# Patient Record
Sex: FEMALE | Race: WHITE | Employment: OTHER | URBAN - METROPOLITAN AREA
[De-identification: names, ages, dates, MRNs, and addresses within clinical notes are randomized per-mention and may not be internally consistent; named-entity substitution may affect disease eponyms.]

---

## 2017-01-03 DIAGNOSIS — G40.209 PARTIAL EPILEPSY WITH IMPAIRMENT OF CONSCIOUSNESS (HCC): ICD-10-CM

## 2017-01-05 RX ORDER — OXCARBAZEPINE 150 MG/1
TABLET, FILM COATED ORAL
Qty: 630 TABLET | Refills: 0 | Status: SHIPPED | OUTPATIENT
Start: 2017-01-05 | End: 2017-04-19 | Stop reason: SDUPTHER

## 2017-03-02 PROBLEM — E78.1 HYPERTRIGLYCERIDEMIA: Status: ACTIVE | Noted: 2017-03-02

## 2017-03-02 PROBLEM — I10 ESSENTIAL HYPERTENSION: Status: ACTIVE | Noted: 2017-03-02

## 2017-04-19 DIAGNOSIS — G40.209 PARTIAL EPILEPSY WITH IMPAIRMENT OF CONSCIOUSNESS (HCC): ICD-10-CM

## 2017-04-19 RX ORDER — OXCARBAZEPINE 150 MG/1
TABLET, FILM COATED ORAL
Qty: 630 TABLET | Refills: 1 | Status: SHIPPED | OUTPATIENT
Start: 2017-04-19 | End: 2017-08-28 | Stop reason: SDUPTHER

## 2017-08-28 ENCOUNTER — OFFICE VISIT (OUTPATIENT)
Dept: NEUROLOGY | Age: 47
End: 2017-08-28
Payer: MEDICARE

## 2017-08-28 VITALS
HEART RATE: 78 BPM | DIASTOLIC BLOOD PRESSURE: 104 MMHG | SYSTOLIC BLOOD PRESSURE: 168 MMHG | BODY MASS INDEX: 34.78 KG/M2 | HEIGHT: 61 IN | WEIGHT: 184.2 LBS

## 2017-08-28 DIAGNOSIS — I67.9 CEREBROVASCULAR DISEASE: ICD-10-CM

## 2017-08-28 DIAGNOSIS — R03.0 ELEVATED BLOOD PRESSURE READING: ICD-10-CM

## 2017-08-28 DIAGNOSIS — M25.572 ARTHRALGIA OF LEFT ANKLE: ICD-10-CM

## 2017-08-28 DIAGNOSIS — G40.209 PARTIAL EPILEPSY WITH IMPAIRMENT OF CONSCIOUSNESS (HCC): Primary | ICD-10-CM

## 2017-08-28 DIAGNOSIS — H54.7 BLINDNESS: ICD-10-CM

## 2017-08-28 PROCEDURE — 1036F TOBACCO NON-USER: CPT | Performed by: PSYCHIATRY & NEUROLOGY

## 2017-08-28 PROCEDURE — G8427 DOCREV CUR MEDS BY ELIG CLIN: HCPCS | Performed by: PSYCHIATRY & NEUROLOGY

## 2017-08-28 PROCEDURE — G8417 CALC BMI ABV UP PARAM F/U: HCPCS | Performed by: PSYCHIATRY & NEUROLOGY

## 2017-08-28 PROCEDURE — 99214 OFFICE O/P EST MOD 30 MIN: CPT | Performed by: PSYCHIATRY & NEUROLOGY

## 2017-08-28 RX ORDER — OXCARBAZEPINE 150 MG/1
TABLET, FILM COATED ORAL
Qty: 630 TABLET | Refills: 3 | Status: SHIPPED | OUTPATIENT
Start: 2017-08-28 | End: 2018-05-13 | Stop reason: SDUPTHER

## 2017-08-28 RX ORDER — NAPROXEN 375 MG/1
375 TABLET ORAL 2 TIMES DAILY WITH MEALS
Qty: 60 TABLET | Refills: 0 | Status: SHIPPED | OUTPATIENT
Start: 2017-08-28 | End: 2017-11-01 | Stop reason: ALTCHOICE

## 2017-11-01 ENCOUNTER — HOSPITAL ENCOUNTER (OUTPATIENT)
Age: 47
Discharge: HOME | End: 2017-11-01
Payer: MEDICARE

## 2017-11-01 ENCOUNTER — HOSPITAL ENCOUNTER (OUTPATIENT)
Dept: GENERAL RADIOLOGY | Age: 47
Discharge: HOME | End: 2017-11-01
Payer: MEDICARE

## 2017-11-01 DIAGNOSIS — M54.50 ACUTE MIDLINE LOW BACK PAIN WITHOUT SCIATICA: ICD-10-CM

## 2017-11-01 PROCEDURE — 72100 X-RAY EXAM L-S SPINE 2/3 VWS: CPT

## 2018-05-13 DIAGNOSIS — G40.209 PARTIAL EPILEPSY WITH IMPAIRMENT OF CONSCIOUSNESS (HCC): ICD-10-CM

## 2018-05-14 RX ORDER — OXCARBAZEPINE 150 MG/1
TABLET, FILM COATED ORAL
Qty: 210 TABLET | Refills: 5 | Status: SHIPPED | OUTPATIENT
Start: 2018-05-14 | End: 2018-08-06 | Stop reason: SDUPTHER

## 2018-07-16 ENCOUNTER — HOSPITAL ENCOUNTER (OUTPATIENT)
Dept: CT IMAGING | Age: 48
Discharge: HOME | End: 2018-07-18
Payer: MEDICARE

## 2018-07-16 DIAGNOSIS — R10.9 ABDOMINAL PAIN, UNSPECIFIED ABDOMINAL LOCATION: ICD-10-CM

## 2018-07-16 PROCEDURE — 74177 CT ABD & PELVIS W/CONTRAST: CPT

## 2018-07-16 PROCEDURE — 6360000004 HC RX CONTRAST MEDICATION: Performed by: NURSE PRACTITIONER

## 2018-07-16 RX ADMIN — IOPAMIDOL 100 ML: 612 INJECTION, SOLUTION INTRAVENOUS at 11:10

## 2018-07-25 ENCOUNTER — HOSPITAL ENCOUNTER (OUTPATIENT)
Dept: ULTRASOUND IMAGING | Age: 48
Discharge: HOME | End: 2018-07-27
Payer: MEDICARE

## 2018-07-25 DIAGNOSIS — N83.201 RIGHT OVARIAN CYST: ICD-10-CM

## 2018-07-25 PROCEDURE — 76856 US EXAM PELVIC COMPLETE: CPT

## 2018-08-06 ENCOUNTER — OFFICE VISIT (OUTPATIENT)
Dept: NEUROLOGY | Age: 48
End: 2018-08-06
Payer: MEDICARE

## 2018-08-06 VITALS
DIASTOLIC BLOOD PRESSURE: 78 MMHG | HEART RATE: 66 BPM | SYSTOLIC BLOOD PRESSURE: 148 MMHG | WEIGHT: 183.4 LBS | HEIGHT: 61 IN | BODY MASS INDEX: 34.63 KG/M2

## 2018-08-06 DIAGNOSIS — H54.7 BLINDNESS: ICD-10-CM

## 2018-08-06 DIAGNOSIS — M25.572 ARTHRALGIA OF LEFT ANKLE: Primary | ICD-10-CM

## 2018-08-06 DIAGNOSIS — G40.209 PARTIAL EPILEPSY WITH IMPAIRMENT OF CONSCIOUSNESS (HCC): ICD-10-CM

## 2018-08-06 DIAGNOSIS — R03.0 ELEVATED BLOOD PRESSURE READING: ICD-10-CM

## 2018-08-06 DIAGNOSIS — I67.9 CEREBROVASCULAR DISEASE: ICD-10-CM

## 2018-08-06 PROCEDURE — G8427 DOCREV CUR MEDS BY ELIG CLIN: HCPCS | Performed by: PSYCHIATRY & NEUROLOGY

## 2018-08-06 PROCEDURE — 1036F TOBACCO NON-USER: CPT | Performed by: PSYCHIATRY & NEUROLOGY

## 2018-08-06 PROCEDURE — G8417 CALC BMI ABV UP PARAM F/U: HCPCS | Performed by: PSYCHIATRY & NEUROLOGY

## 2018-08-06 PROCEDURE — 99214 OFFICE O/P EST MOD 30 MIN: CPT | Performed by: PSYCHIATRY & NEUROLOGY

## 2018-08-06 RX ORDER — OXCARBAZEPINE 150 MG/1
TABLET, FILM COATED ORAL
Qty: 210 TABLET | Refills: 5 | Status: SHIPPED | OUTPATIENT
Start: 2018-08-06 | End: 2019-05-04 | Stop reason: SDUPTHER

## 2018-08-06 NOTE — PROGRESS NOTES
NEUROLOGY FOLLOW-UP  Patient Name:  Rigoberto Rubin  :   1970  Clinic Visit Date: 2018    I saw Ms. Rigoberto Rubin in follow-up in the office today in continuation of neurologic care. She is a 51 y.o.  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 Her hx is also significant for white coat syndrome. Today she is brought to the clinic by her mom stating that patient has not had any seizure activity since last visit. She does have arthritis in both ankles and she has difficulty walking with it. Patient has been using walker. She had occasional falls related to arthritis superimposed on legal blindness. Patient has not had any head injury resulting in headaches or breakthrough seizures. Mom stated \"Blood pressure checked at work shop and its always 120s/low 80s\". Patient was diagnosed with \"white coat syndrome\". Patient's mom also stated that her PCP, Dr. Rafia Davidson has been watching her blood work and has been closely following her daughter and patient's mom is happy with the care stating once a year follow up in neurology clinic is ok. Patient has not had any aura or feeling of about to have a seizure since spring of 2017. Patient's mom is requesting a prescription for Trileptal and also requested not to make any changes in Trileptal dose as patient is tolerating it well. Of note; her last seizure was in . She has not had any recent hospitalizations. During earlier visits, discussion done about slow AED downward taper. Earlier patient agreed for it. But later patient became very agitated with the concern about possibility of seizure recurrence by decreasing the dose of Trileptal.  Mom says patient is very scared of decreasing the dose of trileptal.  Mom requested not to make any changes.   Presently she is on Trileptal 150 mg 3 tab in am and 4 tab in pm.  Patient has not been having any adverse effects related to 1978     Social History: Jaime Calhoun  reports that she has never smoked. She has never used smokeless tobacco. She reports that she does not drink alcohol or use drugs. Family History   Problem Relation Age of Onset    Hypertension Mother     High Cholesterol Mother     High Blood Pressure Mother     Arthritis Mother     No Known Problems Father      On exam: Blood pressure (!) 148/78, pulse 66, height 5' 1\" (1.549 m), weight 183 lb 6.4 oz (83.2 kg), not currently breastfeeding. Patient's mom stated; when checked at home; its always normal.    General appearance: Ana Rubio is a well-built and well-nourished female  in no acute cardiorespiratory distress. HEENT:  Normocephalic and atraumatic. Neck:  Supple. No carotid bruits heard. Neurologic exam:  she  is alert, awake, and oriented to self, place, and time. she followed one-step as well as complex commands. she  could recall major news events well. she has good naming and good repetition. Able to spell the word backwards well. Able to do serial subtractions well. Able to read a sentence well. Able to write a sentence well.    her higher intellectual functions seem to be with in normal limits. Cranial nerve exam:  Visual acuity 20/200 in right eye and 20/400 in left eye without correction. PERRLA and EOMI. Visual fields are grossly full to confrontation. Fundi reveal intact venous pulsations. Disc margins are clear. Facial sensation is intact. Face appears symmetric. Tongue protrudes midline. Palate elevates symmetrically. Able to hear to the finger rub equally well. Shoulder shrug is intact bilaterally. Motor exam:  she has good strength of grade 5/5 in proximal and distal muscle groups of both left and right and  upper and lower extremities. DTRs are 1+ and symmetric at biceps, brachioradialis and triceps reflexes. DTRs:  2+ patellar and Achilles reflexes bilaterally. No pronator drift noted. No atrophy noted.  Sensory exam:  Intact to blood pressures at home and check with PCP; they voiced understanding those instructions. Counseling done regarding avoidance of seizure precipitating factors such as sleep deprivation and compliance with medications, etc. Complications and sequelae of uncontrolled seizures also discussed. Activity (bathing, swimming, etc)  restrictions also discussed. CVA, unspecified; Bilateral occipital atrophy with legal blindness: on ASA, Pravastatin for stroke prophylaxis. Hyperlipidemia with elevated LDL at 120 (with target LDL < 100): after switch from Pravastatin to Atorvastatin; LDL level has improved to 91 as above. Follow up in  1 year. Please note that portions of this note were completed with a voice recognition program.  Although every effort was made to ensure the accuracy of this  automated transcription, some errors in transcription may have occurred, occasionally words are mis-transcribed.

## 2018-08-09 ENCOUNTER — HOSPITAL ENCOUNTER (OUTPATIENT)
Dept: PHYSICAL THERAPY | Age: 48
Setting detail: THERAPIES SERIES
Discharge: HOME | End: 2018-08-09
Payer: MEDICARE

## 2018-08-09 PROCEDURE — 97110 THERAPEUTIC EXERCISES: CPT

## 2018-08-09 PROCEDURE — G8979 MOBILITY GOAL STATUS: HCPCS

## 2018-08-09 PROCEDURE — 97161 PT EVAL LOW COMPLEX 20 MIN: CPT

## 2018-08-09 PROCEDURE — 97140 MANUAL THERAPY 1/> REGIONS: CPT

## 2018-08-09 PROCEDURE — G8978 MOBILITY CURRENT STATUS: HCPCS

## 2018-08-09 NOTE — PROGRESS NOTES
verbalized/demonstrated good understanding:     [x] Yes         [] No, pt required further clarification. [] Primary Impairment :  G Code:    [x] Mobility         [] Carry        [] Body Position       [] Self Care      [] Other:   Functional Impairment Current:  [] 0%    [] 1-19% [] 20-39% [x] 40-59% [] 60-79%   [] 80-99% [] 100%  Functional Impairment Goal:  [] 0%    [] 1-19% [x] 20-39% [] 40-59% [] 60-79%   [] 80-99% [] 100%  G Code Functional Impairment determined by:  [x] Clinical Judgment   [] Outcome Measure:     Goals  Short term goals  Time Frame for Short term goals: 3 weeks  Short term goal 1: Pt to be instructed in home program.   Short term goal 2: Pt to have only min tenderness right greater trochanter. Short term goal 3: Pt to begin hip strengthening program.     Long term goals  Time Frame for Long term goals : 6 weeks  Long term goal 1: Pt to report independence and compliance with home program.   Long term goal 2: Pt to demonstrate 4/5 strength right hip flex and glute medius to assist with ADL's. Long term goal 3: Pt to have no complaints of tenderness with palpation.        Patient goals : \"Control pain\"        Minutes Tracking:  Time In: 6908  Time Out: [address]  Minutes: [address], PT, DPT, CMPT     8/9/2018

## 2018-08-09 NOTE — PLAN OF CARE
[facility]           Phone: [phone]             Outpatient Physical Therapy  Fax: [phone]                                           Date: 2018  Patient: Norma Lieberman : 1970 CSN #: [id number]   Referring Practitioner:  Peggy Navarro. Trena Muro DO Referral Date:  18       [x] Plan of Care   [] Updated Plan of Care    Dates of Service to Include: 2018 to 18    Diagnosis:  Gr. Trochanteric Bursitis, M70.61    Rehab (Treatment) Diagnosis:  right hip pain             Onset Date:  18    Attendance  Total # of Visits to Date: 1 No Show: 0 Canceled Appointment: 0    Assessment  Assessment: Pt is 50 y/o female with complaints of right hip pain. Ambulates with 4WW inside and outside of home due to balance. Moderate tenderness right gr. trochanter. ROM is WFL. Strength right hip: flex 4-/5, glute med 3+/5; left hip: flex 4/5, glute med 4/5. Pain noted with right hip scour and MARVEL testing. Pt will benefit from PT to address deficits. [x] Primary Impairment :   G Code:    [x] Mobility         [] Carry        [] Body Position       [] Self Care      [] Other:   Functional Impairment Current:  [] 0%    [] 1-19% [] 20-39% [x] 40-59% [] 60-79%    [] 80-99% [] 100%  Functional Impairment Goal:  [] 0%    [] 1-19% [x] 20-39% [] 40-59% [] 60-79%    [] 80-99% [] 100%  G Code Functional Impairment determined by:  [x] Clinical Judgment   [] Outcome Measure:     Goals  Short term goals  Time Frame for Short term goals: 3 weeks  Short term goal 1: Pt to be instructed in home program.   Short term goal 2: Pt to have only min tenderness right greater trochanter.    Short term goal 3: Pt to begin hip strengthening program.   Long term goals  Time Frame for Long term goals : 6 weeks  Long term goal 1: Pt to report independence and compliance with home program.   Long term goal 2: Pt to demonstrate 4/5 strength

## 2018-08-13 ENCOUNTER — HOSPITAL ENCOUNTER (OUTPATIENT)
Dept: PHYSICAL THERAPY | Age: 48
Setting detail: THERAPIES SERIES
Discharge: HOME | End: 2018-08-13
Payer: MEDICARE

## 2018-08-13 PROCEDURE — 97110 THERAPEUTIC EXERCISES: CPT

## 2018-08-13 PROCEDURE — 97140 MANUAL THERAPY 1/> REGIONS: CPT

## 2018-08-13 NOTE — PROGRESS NOTES
Phone: Leslie           Fax: [phone]                           Outpatient Physical Therapy                                                                            Daily Note    Patient: Luís Barragan : 1970  CSN #: [id number]   Referring Practitioner:  Scout Null. Jason Quan DO    Referral Date : 18     Date: 2018    Diagnosis: Gr. Trochanteric Bursitis, M70.61  Treatment Diagnosis: right hip pain    Onset Date: 18  PT Insurance Information: Medicare / Medicaid  Total # of Visits Approved: 12 Per Physician Order  Total # of Visits to Date: 2  No Show: 0  Canceled Appointment: 0      Pre-Treatment Pain:  4/10  Subjective: Liked the soft tissue mobilization last visit but according to caregiver, pt was slightly more unsteady following evaluation. Exercises:  Exercise 2: hooklying hip ER red band - 2x10  Exercise 3: bike - 4 mins        Manual:  Joint mobilization: oscilations to right hip   Manual traction: right hip  Soft Tissue Mobalization: STM heated thermopobe at 3 bars x 8 mins      Assessment  Assessment: Pt with fair/poor tolerance to recumbant bike this date, most due to c/o foot pain. Requires frequent cueing during exercises for proper form and technique. Will continue to progress as pt tolerates. Patient Education  Purpose of MT and importance of HEP  Pt verbalized/demonstrated good understanding:     [x] Yes         [] No, pt required further clarification. Post Treatment Pain:  4/10      Plan  Times per week: 3  Plan weeks: 6      Goals  (Total # of Visits to Date: 2)   Short Term Goals - Time Frame for Short term goals: 3 weeks     Short term goal 1: Pt to be instructed in home program. - met                                        []Met   []Partially met  []Not met   Short term goal 2: Pt to have only min tenderness right greater trochanter.    []Met   []Partially met  []Not met   Short term goal 3: Pt to begin hip

## 2018-08-16 ENCOUNTER — HOSPITAL ENCOUNTER (OUTPATIENT)
Dept: PHYSICAL THERAPY | Age: 48
Setting detail: THERAPIES SERIES
Discharge: HOME | End: 2018-08-16
Payer: MEDICARE

## 2018-08-16 PROCEDURE — 97140 MANUAL THERAPY 1/> REGIONS: CPT

## 2018-08-16 PROCEDURE — 97110 THERAPEUTIC EXERCISES: CPT

## 2018-08-16 NOTE — PROGRESS NOTES
Phone: Leslie           Fax: [phone]                           Outpatient Physical Therapy                                                                            Daily Note    Patient: Alejo Diaz : 1970  CSN #: [id number]   Referring Practitioner:  Omar Chaudhry DO    Referral Date : 18     Date: 2018    Diagnosis: Gr. Trochanteric Bursitis, M70.61  Treatment Diagnosis: right hip pain    Onset Date: 18  PT Insurance Information: Medicare / Medicaid  Total # of Visits Approved: 12 Per Physician Order  Total # of Visits to Date: 3  No Show: 0  Canceled Appointment: 0      Pre-Treatment Pain:  4/10  Subjective: Pt reports she feels like she is getting better. Pt states her hip overall feels less pain. Pt rates current pain 4/10. Exercises:  Exercise 1: **HEP - hooklying hip ER red band  Exercise 2: hooklying hip ER red band - 2x10  Exercise 3: bike - 5 mins  Exercise 4: Sink ex 15x ea       Manual:      Manual traction: right hip  Soft Tissue Mobalization: STM heated thermopobe at 3 bars x 8 mins    Assessment  Assessment: Added sink ex today to Pt ROSALIA program with appropriate tolerance for increased Bruno LE and hip strength. Pt completed 5 mins on [employer] bike today. Voiced pain relief with R hip distractions/ thermoprobe. Patient Education  Cont current HEP. Pt verbalized/demonstrated good understanding:     [x] Yes         [] No, pt required further clarification. Post Treatment Pain:  4/10      Plan  Times per week: 3  Plan weeks: 6      Goals  (Total # of Visits to Date: 3)   Short Term Goals - Time Frame for Short term goals: 3 weeks     Short term goal 1: Pt to be instructed in home program. - met                                        [x]Met   []Partially met  []Not met   Short term goal 2: Pt to have only min tenderness right greater trochanter.    []Met  []Partially met  [x]Not met   Short term goal 3: Pt to begin hip strengthening program. -MET, cont  [x]Met   []Partially met  []Not met      []Met   []Partially met  []Not met     Long Term Goals - Time Frame for Long term goals : 6 weeks  Long term goal 1: Pt to report independence and compliance with home program.  []Met  []Partially met  [x]Not met   Long term goal 2: Pt to demonstrate 4/5 strength right hip flex and glute medius to assist with ADL's. []Met  []Partially met  [x]Not met   Long term goal 3: Pt to have no complaints of tenderness with palpation.   []Met  []Partially met  [x]Not met     []Met  []Partially met  []Not met     []Met  []Partially met  []Not met       Minutes Tracking:  Time In: 1401  Time Out: [address]  Minutes: Junaid Rendon    Date: 8/16/2018

## 2018-08-17 ENCOUNTER — HOSPITAL ENCOUNTER (OUTPATIENT)
Dept: PHYSICAL THERAPY | Age: 48
Setting detail: THERAPIES SERIES
Discharge: HOME | End: 2018-08-17
Payer: MEDICARE

## 2018-08-17 PROCEDURE — 97140 MANUAL THERAPY 1/> REGIONS: CPT

## 2018-08-17 PROCEDURE — 97110 THERAPEUTIC EXERCISES: CPT

## 2018-08-21 ENCOUNTER — HOSPITAL ENCOUNTER (OUTPATIENT)
Dept: PHYSICAL THERAPY | Age: 48
Setting detail: THERAPIES SERIES
Discharge: HOME | End: 2018-08-21
Payer: MEDICARE

## 2018-08-21 PROCEDURE — 97140 MANUAL THERAPY 1/> REGIONS: CPT

## 2018-08-21 PROCEDURE — 97110 THERAPEUTIC EXERCISES: CPT

## 2018-08-21 NOTE — PROGRESS NOTES
trochanter. -MET  [x]Met   []Partially met  []Not met   Short term goal 3: Pt to begin hip strengthening program. -MET, cont  [x]Met   []Partially met  []Not met      []Met   []Partially met  []Not met     Long Term Goals - Time Frame for Long term goals : 6 weeks  Long term goal 1: Pt to report independence and compliance with home program.  []Met  []Partially met  [x]Not met   Long term goal 2: Pt to demonstrate 4/5 strength right hip flex and glute medius to assist with ADL's. []Met  []Partially met  [x]Not met   Long term goal 3: Pt to have no complaints of tenderness with palpation.   []Met  []Partially met  [x]Not met     []Met  []Partially met  []Not met     []Met  []Partially met  []Not met       Minutes Tracking:  Time In: 7438  Time Out: [address] [facility]  Minutes: [place], [place]    Date: 8/21/2018

## 2018-08-23 ENCOUNTER — APPOINTMENT (OUTPATIENT)
Dept: PHYSICAL THERAPY | Age: 48
End: 2018-08-23
Payer: MEDICARE

## 2018-08-24 ENCOUNTER — HOSPITAL ENCOUNTER (OUTPATIENT)
Dept: PHYSICAL THERAPY | Age: 48
Setting detail: THERAPIES SERIES
Discharge: HOME | End: 2018-08-24
Payer: MEDICARE

## 2018-08-24 PROCEDURE — 97140 MANUAL THERAPY 1/> REGIONS: CPT

## 2018-08-24 PROCEDURE — 97110 THERAPEUTIC EXERCISES: CPT

## 2018-08-24 NOTE — PROGRESS NOTES
Phone: [facility]           Fax: [phone]                           Outpatient Physical Therapy                                                                            Daily Note    Patient: Dayo Hollis : 1970  CSN #: [id number]   Referring Practitioner:  Valente Covarrubias DO    Referral Date : 18     Date: 2018    Diagnosis: Gr. Trochanteric Bursitis, M70.61  Treatment Diagnosis: right hip pain    Onset Date: 18  PT Insurance Information: Medicare / Medicaid  Total # of Visits Approved: 12 Per Physician Order  Total # of Visits to Date: 6  No Show: 0  Canceled Appointment: 0      Pre-Treatment Pain:  0/10  Subjective: Pt reports she felt a muscle spasm and some LBP today throughout work day but feels better with therapy. Exercises:  Exercise 1: **HEP - hooklying hip ER red band  Exercise 2: hooklying hip ER red band - 2x10  Exercise 3: bike - 6 mins     Exercise 5: Seated LAQ, march, toe raises 2# 15x  Exercise 6: RTB add/ abd 15x ea      Manual:      Manual traction: right hip       Assessment  Assessment: LBP reported today from Pt during ther-ex program. Pt reports her hip continues to feel better overall. Patient Education  Cont current HEP. Pt verbalized/demonstrated good understanding:     [x] Yes         [] No, pt required further clarification. Post Treatment Pain:  0/10      Plan  Times per week: 3  Plan weeks: 6      Goals  (Total # of Visits to Date: 6)   Short Term Goals - Time Frame for Short term goals: 3 weeks     Short term goal 1: Pt to be instructed in home program. - met                                        [x]Met   []Partially met  []Not met   Short term goal 2: Pt to have only min tenderness right greater trochanter.  -MET  [x]Met   []Partially met  []Not met   Short term goal 3: Pt to begin hip strengthening program. -MET, cont  [x]Met  []Partially met  []Not met      []Met   []Partially met  []Not met Long Term Goals - Time Frame for Long term goals : 6 weeks  Long term goal 1: Pt to report independence and compliance with home program.  []Met  []Partially met  [x]Not met   Long term goal 2: Pt to demonstrate 4/5 strength right hip flex and glute medius to assist with ADL's. []Met  []Partially met  [x]Not met   Long term goal 3: Pt to have no complaints of tenderness with palpation.   []Met  []Partially met  [x]Not met     []Met  []Partially met  []Not met     []Met  []Partially met  []Not met       Minutes Tracking:  Time In: 5776  Time Out: [address] [facility],Third [address]  Minutes: [place], [place]     Date: 8/24/2018

## 2018-08-27 ENCOUNTER — HOSPITAL ENCOUNTER (OUTPATIENT)
Dept: PHYSICAL THERAPY | Age: 48
Setting detail: THERAPIES SERIES
Discharge: HOME | End: 2018-08-27
Payer: MEDICARE

## 2018-08-27 PROCEDURE — 97140 MANUAL THERAPY 1/> REGIONS: CPT

## 2018-08-27 PROCEDURE — G8979 MOBILITY GOAL STATUS: HCPCS

## 2018-08-27 PROCEDURE — G8978 MOBILITY CURRENT STATUS: HCPCS

## 2018-08-27 NOTE — PROGRESS NOTES
Phone: [facility]           Fax: [phone]                           Outpatient Physical Therapy                                                                            Daily Note    Patient: Mariola Ambrose : 1970  CSN #: [id number]   Referring Practitioner:  Bhaskar Macias DO    Referral Date : 18     Date: 2018    Diagnosis: Gr. Trochanteric Bursitis, M70.61  Treatment Diagnosis: right hip pain    Onset Date: 18  PT Insurance Information: Medicare / Medicaid  Total # of Visits Approved: 12 Per Physician Order  Total # of Visits to Date: 7  No Show: 0  Canceled Appointment: 0      Pre-Treatment Pain:  4/10 based on fascial expression  Subjective: Pt mother states pt has been consistently stumbling and more unsteady on days she has been in therapy; not sure why and would like to speak to Dr about it, will see him tomorrow. Pt states she does feel less pain following treatment. Exercises:   Exercise 7: **Held all exercises this date due to pt reports of unsteadiness following ROSALIA    Manual:  Joint mobilization: oscilations to right hip  Soft Tissue Mobalization: STM heated thermopobe at 3 bars x 8 mins    Modalities:  Moist heat: 10 mins before MT         Assessment  Assessment: Pt has completed 7 PT visits for right hip pain. Pt reports decrease following treatments but mother stating she has been more unsteady on days following exercise. Held all ROSALIA this date and focused on modalities for pain relief. HEP and STM applied in left SL. Pt continues with antalgic gait following treatment, pt mother's report. Pt does also continue with min tenderness right greater trochanter. Will hold PT at this time due to worsening of gait and continued reports of pain; pt has  appointment this Wed.      Patient Education  Hold PT until seen by   Pt verbalized/demonstrated good understanding:     [x] Yes         [] No, pt required further clarification. Post Treatment Pain:  4/10      Plan  Times per week: 3  Plan weeks: 6      Goals  (Total # of Visits to Date: 7)   Short Term Goals - Time Frame for Short term goals: 3 weeks     Short term goal 1: Pt to be instructed in home program. - met                                        []Met   []Partially met  []Not met   Short term goal 2: Pt to have only min tenderness right greater trochanter. -MET  []Met   []Partially met  []Not met   Short term goal 3: Pt to begin hip strengthening program. -MET, cont  []Met   []Partially met  []Not met      []Met   []Partially met  []Not met     Long Term Goals - Time Frame for Long term goals : 6 weeks  Long term goal 1: Pt to report independence and compliance with home program.  []Met  []Partially met  []Not met   Long term goal 2: Pt to demonstrate 4/5 strength right hip flex and glute medius to assist with ADL's. []Met  []Partially met  []Not met   Long term goal 3: Pt to have no complaints of tenderness with palpation.   []Met  []Partially met  []Not met     []Met  []Partially met  []Not met     []Met  []Partially met  []Not met       Minutes Tracking:  Time In: 1345  Time Out: [address]  Minutes: [id number] , PT, DPT, CMPT  Date: 8/27/2018

## 2018-08-27 NOTE — PROGRESS NOTES
[facility]           Phone: [phone]             Outpatient Physical Therapy  Fax: [phone]                                            Physician Progress Report    Date: 2018  Patient: Miky Brandon : 1970 CSN #: [id number]   Referring Practitioner:  Judy Rosen. Madeleine Mooney DO Referral Date:  18       Diagnosis:  Gr. Trochanteric Bursitis, M70.61    Rehab (Treatment) Diagnosis:  right hip pain             Onset Date:  18    Attendance  Total # of Visits to Date: 7 No Show: 0 Canceled Appointment: 0    Assessment  Assessment: Pt has completed 7 PT visits for right hip pain. Pt reports decrease following treatments but mother stating she has been more unsteady on days following exercise. Held all ROSALIA this date and focused on modalities for pain relief. HEP and STM applied in left SL. Pt continues with antalgic gait following treatment, pt mother's report. Pt does also continue with min tenderness right greater trochanter. Will hold PT at this time due to worsening of gait and continued reports of pain; pt has Dr appointment this Wed. Goals  Short term goals  Time Frame for Short term goals: 3 weeks  Short term goal 1: Pt to be instructed in home program. - met  Short term goal 2: Pt to have only min tenderness right greater trochanter. -MET  Short term goal 3: Pt to begin hip strengthening program. -MET, cont  Long term goals  Time Frame for Long term goals : 6 weeks  Long term goal 1: Pt to report independence and compliance with home program.   Long term goal 2: Pt to demonstrate 4/5 strength right hip flex and glute medius to assist with ADL's. Long term goal 3: Pt to have no complaints of tenderness with palpation.      Prognosis  Prognosis: Good, Fair    Treatment Plan   Times per week: 3  Plan weeks: 6  [x]HP/CP      []Electrical Stim   [x]Therapeutic Exercise      []Gait Training  []Aquatics   []Ultrasound         [x]Patient Education/HEP   [x]Manual Therapy  []Traction    []Neuro-cynthia        [x]Soft Tissue Mobs            []Home TENS  []Iontophoresis    []Orthotic casting/fitting      []Dry Needling             Electronically signed by: Davia Closs, PT, DPT    Date: 8/27/2018

## 2018-08-29 ENCOUNTER — APPOINTMENT (OUTPATIENT)
Dept: PHYSICAL THERAPY | Age: 48
End: 2018-08-29
Payer: MEDICARE

## 2018-08-30 ENCOUNTER — HOSPITAL ENCOUNTER (OUTPATIENT)
Dept: PHYSICAL THERAPY | Age: 48
Setting detail: THERAPIES SERIES
End: 2018-08-30
Payer: MEDICARE

## 2018-08-31 ENCOUNTER — APPOINTMENT (OUTPATIENT)
Dept: PHYSICAL THERAPY | Age: 48
End: 2018-08-31
Payer: MEDICARE

## 2018-09-07 ENCOUNTER — HOSPITAL ENCOUNTER (OUTPATIENT)
Dept: PHYSICAL THERAPY | Age: 48
Setting detail: THERAPIES SERIES
Discharge: HOME | End: 2018-09-07
Payer: MEDICARE

## 2018-10-25 ENCOUNTER — HOSPITAL ENCOUNTER (OUTPATIENT)
Dept: PHYSICAL THERAPY | Age: 48
Setting detail: THERAPIES SERIES
Discharge: HOME | End: 2018-10-25
Payer: MEDICARE

## 2018-10-25 PROCEDURE — 97110 THERAPEUTIC EXERCISES: CPT

## 2018-10-25 PROCEDURE — G8979 MOBILITY GOAL STATUS: HCPCS

## 2018-10-25 PROCEDURE — 97161 PT EVAL LOW COMPLEX 20 MIN: CPT

## 2018-10-25 PROCEDURE — G8978 MOBILITY CURRENT STATUS: HCPCS

## 2018-10-25 NOTE — PLAN OF CARE
demo improved AROM of R shoulder to 120* flex/abd, C7 ER, L5 IR to assist with ADL's in the home.      Prognosis  Prognosis: Good, Fair    Treatment Plan   Times per week: 2  Plan weeks: 8  [x]HP/CP      [x]Electrical Stim   [x]Therapeutic Exercise      []Gait Training  []Aquatics   [x]Ultrasound         [x]Patient Education/HEP   [x]Manual Therapy  []Traction    []Neuro-cynthia        [x]Soft Tissue Mobs            []Home TENS  []Iontophoresis    []Orthotic casting/fitting      []Dry Needling             Electronically signed by: Chelita Eagle DPT    Date: 10/25/2018      ______________________________________ Date: 10/25/2018   Physician Signature

## 2018-10-25 NOTE — DISCHARGE SUMMARY
Phone: [address]                 Fax: [phone]                            Outpatient Physical Therapy                                                                    Discharge Summary     Patient: Milagros Wright : 1970       CSN #: [id number]   Referring Practitioner:  Liliana Anthony. Cristal Taylor DO   Referral Date:  18        Diagnosis:  Gr. Trochanteric Bursitis, M70.61    Rehab (Treatment) Diagnosis:  right hip pain             Onset Date:  18        Date Treatment Initiated: 18  Date of Last Treatment: 18        PT Visit Information  Onset Date: 18  PT Insurance Information: Medicare  Total # of Visits Approved: 18  Total # of Visits to Date: 7  No Show: 0  Canceled Appointment: 0        Frequency/Duration  Days: 3 times per week  Weeks: 6 weeks        Treatment Received  [x]HP/CP      []Electrical Stim   [x]Therapeutic Exercise      []Gait Training  []Aquatics   []Ultrasound         [x]Patient Education/HEP   [x]Manual Therapy  []Traction    []Neuro-cynthia        [x]Soft Tissue Mobs            []Home TENS  []Iontophoresis                []Orthotic casting/fitting      []Dry Needling     Assessment  Assessment: Pt completed 7 PT visits. Pt was placed on hold following last session due to pt's mother reporting worsening of gait. Pt has not returned to PT and we will now discharge.         Goals  Short term goals  Time Frame for Short term goals: 3 weeks  Short term goal 1: Pt to be instructed in home program.   Short term goal 2: Pt to have only min tenderness right greater trochanter. Short term goal 3: Pt to begin hip strengthening program.   Long term goals  Time Frame for Long term goals : 6 weeks  Long term goal 1: Pt to report independence and compliance with home program.   Long term goal 2: Pt to demonstrate 4/5 strength right hip flex and glute medius to assist with ADL's.    Long term goal 3: Pt to have no complaints of tenderness with palpation.        Reason for Discharge  [x] Goals Achieved                       [] Poor Follow Through/Attendance                             [x] Optimal Function Achieved     [] Patient Discharged Self                     [] Hospitalization                         [] Physician discharge        Thank you for this referral       Anali Huerta PT, DPT, CMPT                                           Date: 9/25/2018

## 2018-10-29 ENCOUNTER — OFFICE VISIT (OUTPATIENT)
Dept: SURGERY | Age: 48
End: 2018-10-29
Payer: MEDICARE

## 2018-10-29 VITALS
WEIGHT: 184.9 LBS | BODY MASS INDEX: 34.91 KG/M2 | RESPIRATION RATE: 20 BRPM | HEIGHT: 61 IN | HEART RATE: 67 BPM | TEMPERATURE: 97.8 F | DIASTOLIC BLOOD PRESSURE: 90 MMHG | SYSTOLIC BLOOD PRESSURE: 141 MMHG

## 2018-10-29 DIAGNOSIS — D36.9 DERMOID CYST: Primary | ICD-10-CM

## 2018-10-29 DIAGNOSIS — Z86.69 HISTORY OF CEREBRAL PALSY: ICD-10-CM

## 2018-10-29 PROCEDURE — G8417 CALC BMI ABV UP PARAM F/U: HCPCS | Performed by: SURGERY

## 2018-10-29 PROCEDURE — G8484 FLU IMMUNIZE NO ADMIN: HCPCS | Performed by: SURGERY

## 2018-10-29 PROCEDURE — 1036F TOBACCO NON-USER: CPT | Performed by: SURGERY

## 2018-10-29 PROCEDURE — G8427 DOCREV CUR MEDS BY ELIG CLIN: HCPCS | Performed by: SURGERY

## 2018-10-29 PROCEDURE — 99204 OFFICE O/P NEW MOD 45 MIN: CPT | Performed by: SURGERY

## 2018-10-29 RX ORDER — ASPIRIN 81 MG/1
81 TABLET, CHEWABLE ORAL
COMMUNITY
End: 2019-08-13 | Stop reason: SDUPTHER

## 2018-10-29 RX ORDER — IBUPROFEN 800 MG/1
TABLET ORAL
Refills: 0 | COMMUNITY
Start: 2018-09-14 | End: 2018-10-29

## 2018-10-29 RX ORDER — TRAMADOL HYDROCHLORIDE 50 MG/1
TABLET ORAL
Refills: 0 | COMMUNITY
Start: 2018-08-08 | End: 2018-10-29

## 2018-10-29 RX ORDER — CARVEDILOL 3.12 MG/1
TABLET ORAL
COMMUNITY
End: 2018-10-29

## 2018-10-29 RX ORDER — OXCARBAZEPINE 150 MG/1
600 TABLET, FILM COATED ORAL
COMMUNITY
End: 2018-10-29

## 2018-10-29 RX ORDER — CEPHALEXIN 500 MG/1
CAPSULE ORAL
Refills: 0 | COMMUNITY
Start: 2018-10-23 | End: 2019-01-10 | Stop reason: ALTCHOICE

## 2018-10-29 RX ORDER — ATORVASTATIN CALCIUM 20 MG/1
20 TABLET, FILM COATED ORAL
COMMUNITY
Start: 2017-03-18 | End: 2018-10-29

## 2018-10-29 NOTE — LETTER
[facility] [address] [facility]  [address]  Phone: [phone]  Fax: [phone]    Adair Krishnamurthy MD        October 29, 2018     Patient: Aravind Falcon   YOB: 1970   Date of Visit: 10/29/2018       To Whom It May Concern: It is my medical opinion that Aleah Mitchell may return to work on 11/01/2018. If you have any questions or concerns, please don't hesitate to call.     Sincerely,        Adair Krishnamurthy MD

## 2018-10-29 NOTE — PROGRESS NOTES
ABDOMINAL ADHESION SURGERY  8/2001    CHOLECYSTECTOMY  3/2001   [address]    right (97)  & left (98)    HYSTERECTOMY  2012    benign tumor    JOINT REPLACEMENT  05/22/2017    left knee replacement    KNEE ARTHROSCOPY  2011    left    ROTATOR CUFF REPAIR  1999    left    TONSILLECTOMY  1978       Current Outpatient Prescriptions on File Prior to Visit   Medication Sig Dispense Refill    doxycycline hyclate (VIBRA-TABS) 100 MG tablet Take 1 tablet by mouth 2 times daily for 10 days 20 tablet 0    carvedilol (COREG) 6.25 MG tablet TAKE 1 TABLET BY MOUTH 2 TIMES DAILY 180 tablet 0    OXcarbazepine (TRILEPTAL) 150 MG tablet TAKE 3 TABLETS BY MOUTH EVERY MORNING AND 4 TABLETS EVERY EVENING. TOTAL OF 1150MG DAILY. 210 tablet 5    atorvastatin (LIPITOR) 20 MG tablet Take 1 tablet by mouth nightly 90 tablet 1    montelukast (SINGULAIR) 10 MG tablet Take 1 tablet by mouth nightly 90 tablet 1    lactulose (CONSTULOSE) 10 GM/15ML solution Take 30 mLs by mouth 2 times daily 300 mL 1    Multiple Vitamin (MULTIVITAMIN PO) Take  by mouth daily.  CALCIUM CITRATE Take by mouth 2 times daily chewable      Docusate Calcium (STOOL SOFTENER PO) Take  by mouth 2 times daily. No current facility-administered medications on file prior to visit. Allergies   Allergen Reactions    Lisinopril-Hydrochlorothiazide      hyponatremia        reports that she has never smoked. She has never used smokeless tobacco.  reports that she does not drink alcohol.       Review of Systems - History obtained from chart review and the patient  General ROS: No fever  Psychological ROS: History of mental retardation  Ophthalmic ROS: History of left eye motor impairment  ENT ROS: History of asthma  Allergy and Immunology ROS: See of allergy to lisinopril and hydrochlorothiazide  Hematological and Lymphatic ROS: negative  Endocrine ROS: negative  Breast ROS: negative  Respiratory ROS: History of

## 2018-10-30 ENCOUNTER — HOSPITAL ENCOUNTER (OUTPATIENT)
Dept: PHYSICAL THERAPY | Age: 48
Setting detail: THERAPIES SERIES
Discharge: HOME | End: 2018-10-30
Payer: MEDICARE

## 2018-10-30 ENCOUNTER — ANESTHESIA EVENT (OUTPATIENT)
Dept: OPERATING ROOM | Age: 48
End: 2018-10-30
Payer: MEDICARE

## 2018-10-30 PROCEDURE — 97140 MANUAL THERAPY 1/> REGIONS: CPT

## 2018-10-30 PROCEDURE — 97110 THERAPEUTIC EXERCISES: CPT

## 2018-10-30 NOTE — H&P
arthritis(714.0)      Seizures ([facility] [address]       Dr. Lydia Patel.            Past Surgical History:    Past Surgical History             Procedure Laterality Date    ABDOMINAL ADHESION SURGERY   8/2001    CHOLECYSTECTOMY   3/2001    FOOT SURGERY   1997 & 1998     right (97)  & left (98)    HYSTERECTOMY   2012     benign tumor    JOINT REPLACEMENT   05/22/2017     left knee replacement    KNEE ARTHROSCOPY   2011     left    ROTATOR CUFF REPAIR   1999     left    TONSILLECTOMY   1978                   Current Outpatient Prescriptions on File Prior to Visit   Medication Sig Dispense Refill    doxycycline hyclate (VIBRA-TABS) 100 MG tablet Take 1 tablet by mouth 2 times daily for 10 days 20 tablet 0    carvedilol (COREG) 6.25 MG tablet TAKE 1 TABLET BY MOUTH 2 TIMES DAILY 180 tablet 0    OXcarbazepine (TRILEPTAL) 150 MG tablet TAKE 3 TABLETS BY MOUTH EVERY MORNING AND 4 TABLETS EVERY EVENING. TOTAL OF 1150MG DAILY. 210 tablet 5    atorvastatin (LIPITOR) 20 MG tablet Take 1 tablet by mouth nightly 90 tablet 1    montelukast (SINGULAIR) 10 MG tablet Take 1 tablet by mouth nightly 90 tablet 1    lactulose (CONSTULOSE) 10 GM/15ML solution Take 30 mLs by mouth 2 times daily 300 mL 1    Multiple Vitamin (MULTIVITAMIN PO) Take  by mouth daily.          CALCIUM CITRATE Take by mouth 2 times daily chewable        Docusate Calcium (STOOL SOFTENER PO) Take  by mouth 2 times daily.            No current facility-administered medications on file prior to visit.                Allergies   Allergen Reactions    Lisinopril-Hydrochlorothiazide         hyponatremia          reports that she has never smoked.  She has never used smokeless tobacco.  reports that she does not drink alcohol.       Review of Systems - History obtained from chart review and the patient  General ROS: No fever  Psychological ROS: History of mental retardation  Ophthalmic ROS: History of left eye motor impairment  ENT ROS: History of asthma  Allergy palsy. Advised to have excision of the cyst under local Mac, understood and agreed.     Plan:  Scheduled for excision of dermoid cyst she is to stop aspirin and continue the antibiotics     1. Dermoid cyst    2. History of cerebral palsy        Addendum: October 31, 2018  BP (!) 178/109   Pulse 78   Temp 97 °F (36.1 °C) (Oral)   Resp 18   Ht 5' 1\" (1.549 m)   Wt 184 lb (83.5 kg)   SpO2 95%   BMI 34.77 kg/m²   The patient is evaluated and the site of surgery was marked the patient is ready for surgery and has not had any change in her general health since was seen in the office and documented as above.

## 2018-10-31 ENCOUNTER — ANESTHESIA (OUTPATIENT)
Dept: OPERATING ROOM | Age: 48
End: 2018-10-31
Payer: MEDICARE

## 2018-10-31 ENCOUNTER — HOSPITAL ENCOUNTER (OUTPATIENT)
Age: 48
Setting detail: OUTPATIENT SURGERY
Discharge: HOME | End: 2018-10-31
Attending: SURGERY | Admitting: SURGERY
Payer: MEDICARE

## 2018-10-31 ENCOUNTER — APPOINTMENT (OUTPATIENT)
Dept: PHYSICAL THERAPY | Age: 48
End: 2018-10-31
Payer: MEDICARE

## 2018-10-31 VITALS
OXYGEN SATURATION: 97 % | HEIGHT: 61 IN | WEIGHT: 184 LBS | TEMPERATURE: 97.6 F | SYSTOLIC BLOOD PRESSURE: 151 MMHG | RESPIRATION RATE: 18 BRPM | BODY MASS INDEX: 34.74 KG/M2 | HEART RATE: 69 BPM | DIASTOLIC BLOOD PRESSURE: 91 MMHG

## 2018-10-31 VITALS — SYSTOLIC BLOOD PRESSURE: 119 MMHG | DIASTOLIC BLOOD PRESSURE: 63 MMHG | OXYGEN SATURATION: 98 %

## 2018-10-31 DIAGNOSIS — G89.18 POSTOPERATIVE PAIN: Primary | ICD-10-CM

## 2018-10-31 PROCEDURE — 7100000010 HC PHASE II RECOVERY - FIRST 15 MIN: Performed by: SURGERY

## 2018-10-31 PROCEDURE — 3600000012 HC SURGERY LEVEL 2 ADDTL 15MIN: Performed by: SURGERY

## 2018-10-31 PROCEDURE — 6370000000 HC RX 637 (ALT 250 FOR IP): Performed by: SURGERY

## 2018-10-31 PROCEDURE — 2709999900 HC NON-CHARGEABLE SUPPLY: Performed by: SURGERY

## 2018-10-31 PROCEDURE — 88304 TISSUE EXAM BY PATHOLOGIST: CPT

## 2018-10-31 PROCEDURE — 3600000002 HC SURGERY LEVEL 2 BASE: Performed by: SURGERY

## 2018-10-31 PROCEDURE — 7100000011 HC PHASE II RECOVERY - ADDTL 15 MIN: Performed by: SURGERY

## 2018-10-31 PROCEDURE — 3700000001 HC ADD 15 MINUTES (ANESTHESIA): Performed by: SURGERY

## 2018-10-31 PROCEDURE — 2580000003 HC RX 258: Performed by: SURGERY

## 2018-10-31 PROCEDURE — 21931 EXC BACK LES SC 3 CM/>: CPT | Performed by: SURGERY

## 2018-10-31 PROCEDURE — 2500000003 HC RX 250 WO HCPCS: Performed by: SURGERY

## 2018-10-31 PROCEDURE — 6360000002 HC RX W HCPCS: Performed by: NURSE ANESTHETIST, CERTIFIED REGISTERED

## 2018-10-31 PROCEDURE — 3700000000 HC ANESTHESIA ATTENDED CARE: Performed by: SURGERY

## 2018-10-31 PROCEDURE — 2500000003 HC RX 250 WO HCPCS: Performed by: NURSE ANESTHETIST, CERTIFIED REGISTERED

## 2018-10-31 RX ORDER — LIDOCAINE HYDROCHLORIDE 10 MG/ML
INJECTION, SOLUTION INFILTRATION; PERINEURAL PRN
Status: DISCONTINUED | OUTPATIENT
Start: 2018-10-31 | End: 2018-10-31 | Stop reason: HOSPADM

## 2018-10-31 RX ORDER — LIDOCAINE HYDROCHLORIDE 20 MG/ML
INJECTION, SOLUTION EPIDURAL; INFILTRATION; INTRACAUDAL; PERINEURAL PRN
Status: DISCONTINUED | OUTPATIENT
Start: 2018-10-31 | End: 2018-10-31 | Stop reason: SDUPTHER

## 2018-10-31 RX ORDER — PROPOFOL 10 MG/ML
INJECTION, EMULSION INTRAVENOUS PRN
Status: DISCONTINUED | OUTPATIENT
Start: 2018-10-31 | End: 2018-10-31 | Stop reason: SDUPTHER

## 2018-10-31 RX ORDER — SODIUM CHLORIDE, SODIUM LACTATE, POTASSIUM CHLORIDE, CALCIUM CHLORIDE 600; 310; 30; 20 MG/100ML; MG/100ML; MG/100ML; MG/100ML
INJECTION, SOLUTION INTRAVENOUS CONTINUOUS
Status: DISCONTINUED | OUTPATIENT
Start: 2018-10-31 | End: 2018-10-31 | Stop reason: HOSPADM

## 2018-10-31 RX ORDER — DIMENHYDRINATE 50 MG/1
50 TABLET ORAL ONCE
Status: COMPLETED | OUTPATIENT
Start: 2018-10-31 | End: 2018-10-31

## 2018-10-31 RX ORDER — HYDROCODONE BITARTRATE AND ACETAMINOPHEN 5; 325 MG/1; MG/1
1 TABLET ORAL EVERY 6 HOURS PRN
Qty: 20 TABLET | Refills: 0 | Status: SHIPPED | OUTPATIENT
Start: 2018-10-31 | End: 2018-11-05

## 2018-10-31 RX ORDER — BUPIVACAINE HYDROCHLORIDE 5 MG/ML
INJECTION, SOLUTION EPIDURAL; INTRACAUDAL PRN
Status: DISCONTINUED | OUTPATIENT
Start: 2018-10-31 | End: 2018-10-31 | Stop reason: HOSPADM

## 2018-10-31 RX ORDER — MIDAZOLAM HYDROCHLORIDE 1 MG/ML
INJECTION INTRAMUSCULAR; INTRAVENOUS PRN
Status: DISCONTINUED | OUTPATIENT
Start: 2018-10-31 | End: 2018-10-31 | Stop reason: SDUPTHER

## 2018-10-31 RX ORDER — ACETAMINOPHEN 325 MG/1
650 TABLET ORAL ONCE
Status: COMPLETED | OUTPATIENT
Start: 2018-10-31 | End: 2018-10-31

## 2018-10-31 RX ORDER — PROPOFOL 10 MG/ML
INJECTION, EMULSION INTRAVENOUS CONTINUOUS PRN
Status: DISCONTINUED | OUTPATIENT
Start: 2018-10-31 | End: 2018-10-31 | Stop reason: SDUPTHER

## 2018-10-31 RX ADMIN — PROPOFOL 200 MCG/KG/MIN: 10 INJECTION, EMULSION INTRAVENOUS at 10:55

## 2018-10-31 RX ADMIN — DIMENHYDRINATE 50 MG: 50 TABLET ORAL at 10:21

## 2018-10-31 RX ADMIN — MIDAZOLAM HYDROCHLORIDE 2 MG: 1 INJECTION, SOLUTION INTRAMUSCULAR; INTRAVENOUS at 10:53

## 2018-10-31 RX ADMIN — LIDOCAINE HYDROCHLORIDE 100 MG: 20 INJECTION, SOLUTION EPIDURAL; INFILTRATION; INTRACAUDAL at 11:00

## 2018-10-31 RX ADMIN — PROPOFOL 30 MG: 10 INJECTION, EMULSION INTRAVENOUS at 11:28

## 2018-10-31 RX ADMIN — SODIUM CHLORIDE, POTASSIUM CHLORIDE, SODIUM LACTATE AND CALCIUM CHLORIDE: 600; 310; 30; 20 INJECTION, SOLUTION INTRAVENOUS at 10:15

## 2018-10-31 RX ADMIN — ACETAMINOPHEN 650 MG: 325 TABLET ORAL at 10:21

## 2018-10-31 RX ADMIN — PROPOFOL 30 MG: 10 INJECTION, EMULSION INTRAVENOUS at 11:24

## 2018-10-31 ASSESSMENT — PAIN SCALES - GENERAL
PAINLEVEL_OUTOF10: 0

## 2018-10-31 ASSESSMENT — PULMONARY FUNCTION TESTS
PIF_VALUE: 1
PIF_VALUE: 2
PIF_VALUE: 1
PIF_VALUE: 4
PIF_VALUE: 1
PIF_VALUE: 2
PIF_VALUE: 1
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
## 2018-11-02 LAB — DERMATOLOGY PATHOLOGY REPORT: NORMAL

## 2018-11-06 ENCOUNTER — OFFICE VISIT (OUTPATIENT)
Dept: SURGERY | Age: 48
End: 2018-11-06

## 2018-11-06 ENCOUNTER — HOSPITAL ENCOUNTER (OUTPATIENT)
Dept: PHYSICAL THERAPY | Age: 48
Setting detail: THERAPIES SERIES
Discharge: HOME | End: 2018-11-06
Payer: MEDICARE

## 2018-11-06 VITALS
SYSTOLIC BLOOD PRESSURE: 170 MMHG | HEIGHT: 61 IN | BODY MASS INDEX: 34.97 KG/M2 | HEART RATE: 64 BPM | WEIGHT: 185.2 LBS | DIASTOLIC BLOOD PRESSURE: 103 MMHG | TEMPERATURE: 97.4 F | RESPIRATION RATE: 20 BRPM

## 2018-11-06 DIAGNOSIS — Z48.89 POSTOPERATIVE VISIT: ICD-10-CM

## 2018-11-06 DIAGNOSIS — D36.9 DERMOID CYST: Primary | ICD-10-CM

## 2018-11-06 PROCEDURE — 97110 THERAPEUTIC EXERCISES: CPT

## 2018-11-06 PROCEDURE — 99024 POSTOP FOLLOW-UP VISIT: CPT | Performed by: SURGERY

## 2018-11-06 PROCEDURE — 97140 MANUAL THERAPY 1/> REGIONS: CPT

## 2018-11-06 NOTE — PROGRESS NOTES
met  []Not met   Short term goal 3: Pt. to demo increased AAROM of R shoulder to 100* flex/abd, ER to 30* with no increase in pain to improve functional mobility  []Met   []Partially met  []Not met      []Met   []Partially met  []Not met     Long Term Goals - Time Frame for Long term goals : 8 weeks  Long term goal 1: Pt. to be independent with HEP []Met  []Partially met  []Not met   Long term goal 2: Pt. to demo 4/5 R shoulder strength all planes to improve functional mobility []Met  []Partially met  []Not met   Long term goal 3: Pt. to demo improved AROM of R shoulder to 120* flex/abd, C7 ER, L5 IR to assist with ADL's in the home.   []Met  []Partially met  []Not met     []Met  []Partially met  []Not met     []Met  []Partially met  []Not met       Minutes Tracking:  Time In: [facility]  Time Out: [address] [facility] 114  Minutes: [address] [place], [place], DPT  Date: 11/6/2018

## 2018-11-08 ENCOUNTER — HOSPITAL ENCOUNTER (OUTPATIENT)
Dept: PHYSICAL THERAPY | Age: 48
Setting detail: THERAPIES SERIES
Discharge: HOME | End: 2018-11-08
Payer: MEDICARE

## 2018-11-08 PROCEDURE — 97140 MANUAL THERAPY 1/> REGIONS: CPT

## 2018-11-08 PROCEDURE — 97110 THERAPEUTIC EXERCISES: CPT

## 2018-11-08 NOTE — PROGRESS NOTES
Phone: Leslie           Fax: [phone]                           Outpatient Physical Therapy                                                                            Daily Note    Patient: Ashley Taylor : 1970  CSN #: [id number]   Referring Practitioner:  Mario Lockwood. Benji Winslow DO    Referral Date : 10/23/18     Date: 2018    Diagnosis: R shoulder strain/sprain, M75.121  Treatment Diagnosis: R shoulder pain; impingement, biceps injury    Onset Date: 18  PT Insurance Information: Medicare / Medicaid  Total # of Visits Approved: 24 Per Physician Order  Total # of Visits to Date: 4  No Show: 0  Canceled Appointment: 0      Pre-Treatment Pain:  10/10  Subjective: Pt. cont. to report pain 10/10 but is smiling and laughing. Exercises:     Exercise 2: Scap retractions, shrugs 2x10  Exercise 3: Therapy ball roll fwd/back, side to side in sitting  Exercise 4: Seated cane flexion, seated cane ER  Exercise 5: Seated bicep curl with 2# cane 2x10      Manual:  Joint mobilization: Gr I-III inferior mobs to R shoulder for improved mobility              Soft Tissue Mobalization: Heated thermoprobe to R shoulder, R upper trap; STM to cervical paraspinal, R UT and teres minor        Assessment  Assessment: Pt. mo. all ther ex well with no increase in pain and requires verbal and tactile cues to complete. heated thermoprobe to decrease pain today and patient reported decreased pain following manual therapy. Pt. demo'd increased AAROM with cane in sitting, flex: 105*, ER: 30*. Will cont. to progress as mo. Patient Education  Exercise technique  Pt verbalized/demonstrated good understanding:     [] Yes         [x] No, pt required further clarification.     Post Treatment Pain:  8/10      Plan  Times per week: 2  Plan weeks: 8      Goals  (Total # of Visits to Date: 4)   Short Term Goals - Time Frame for Short term goals: 3 weeks     Short term goal 1: Pt. to initiate home program - met                                        []Met   []Partially met  []Not met      []Met   []Partially met  []Not met   Short term goal 3: Pt. to demo increased AAROM of R shoulder to 100* flex/abd, ER to 30* with no increase in pain to improve functional mobility - met (flex: 105*, ER: 30* with cane in sitting)  []Met   []Partially met  []Not met      []Met   []Partially met  []Not met     Long Term Goals - Time Frame for Long term goals : 8 weeks  Long term goal 1: Pt. to be independent with HEP []Met  []Partially met  []Not met   Long term goal 2: Pt. to demo 4/5 R shoulder strength all planes to improve functional mobility []Met  []Partially met  []Not met   Long term goal 3: Pt. to demo improved AROM of R shoulder to 120* flex/abd, C7 ER, L5 IR to assist with ADL's in the home.   []Met  []Partially met  []Not met     []Met  []Partially met  []Not met     []Met  []Partially met  []Not met       Minutes Tracking:  Time In: [facility]  Time Out: [facility]  Minutes: [facility], PT, DPT Date: 11/8/2018

## 2018-11-13 ENCOUNTER — HOSPITAL ENCOUNTER (OUTPATIENT)
Dept: PHYSICAL THERAPY | Age: 48
Setting detail: THERAPIES SERIES
Discharge: HOME | End: 2018-11-13
Payer: MEDICARE

## 2018-11-13 PROCEDURE — 97140 MANUAL THERAPY 1/> REGIONS: CPT

## 2018-11-13 PROCEDURE — 97110 THERAPEUTIC EXERCISES: CPT

## 2018-11-13 NOTE — PROGRESS NOTES
- Time Frame for Short term goals: 3 weeks     Short term goal 1: Pt. to initiate home program - met                                        []Met   []Partially met  []Not met   Short term goal 2: Pt. to have only min tenderness at R biceps tendon  []Met   []Partially met  []Not met   Short term goal 3: Pt. to demo increased AAROM of R shoulder to 100* flex/abd, ER to 30* with no increase in pain to improve functional mobility - met (flex: 105*, ER: 30* with cane in sitting)  []Met   []Partially met  []Not met      []Met   []Partially met  []Not met     Long Term Goals - Time Frame for Long term goals : 8 weeks  Long term goal 1: Pt. to be independent with HEP []Met  []Partially met  []Not met   Long term goal 2: Pt. to demo 4/5 R shoulder strength all planes to improve functional mobility []Met  []Partially met  []Not met   Long term goal 3: Pt. to demo improved AROM of R shoulder to 120* flex/abd, C7 ER, L5 IR to assist with ADL's in the home.   []Met  []Partially met  []Not met     []Met  []Partially met  []Not met     []Met  []Partially met  []Not met       Minutes Tracking:  Time In: [place]  Time Out: [address] [employer] [address]  Minutes: [address] [facility] [address] [facility] [address], [address], DPT  Date: 11/13/2018

## 2018-11-23 ENCOUNTER — HOSPITAL ENCOUNTER (OUTPATIENT)
Dept: PHYSICAL THERAPY | Age: 48
Setting detail: THERAPIES SERIES
Discharge: HOME | End: 2018-11-23
Payer: MEDICARE

## 2018-11-26 ENCOUNTER — HOSPITAL ENCOUNTER (OUTPATIENT)
Dept: MRI IMAGING | Age: 48
Discharge: HOME | End: 2018-11-28
Payer: MEDICARE

## 2018-11-26 DIAGNOSIS — M75.121 COMPLETE TEAR OF RIGHT ROTATOR CUFF: ICD-10-CM

## 2018-11-26 PROCEDURE — 73221 MRI JOINT UPR EXTREM W/O DYE: CPT

## 2018-11-27 ENCOUNTER — APPOINTMENT (OUTPATIENT)
Dept: PHYSICAL THERAPY | Age: 48
End: 2018-11-27
Payer: MEDICARE

## 2018-11-29 ENCOUNTER — APPOINTMENT (OUTPATIENT)
Dept: PHYSICAL THERAPY | Age: 48
End: 2018-11-29
Payer: MEDICARE

## 2018-12-12 NOTE — DISCHARGE SUMMARY
Phone: Leslie          Fax: [phone]                            Outpatient Physical Therapy                                                                    Discharge Summary    Patient: Lucas Reinoso  : 1970  CSN #: [id number]   Referring physician: No admitting provider for patient encounter. Referring Practitioner: Lucien Caal. Scott Roque DO      Diagnosis: R shoulder strain/sprain, M75.121      Date Treatment Initiated: 10/25/18  Date of Last Treatment: 18      PT Visit Information  Onset Date: 18  PT Insurance Information: Medicare/ Medicaid  Total # of Visits Approved: 24  Total # of Visits to Date: 5  Plan of Care/Certification Expiration Date: 18  No Show: 0  Canceled Appointment: 2      Frequency/Duration   2 times per week   8 weeks      Treatment Received  [x]HP/CP      [x]Electrical Stim   [x]Therapeutic Exercise      []Gait Training  []Aquatics   [x]Ultrasound         [x]Patient Education/HEP   [x]Manual Therapy  []Traction    []Neuro-cynthia        [x]Soft Tissue Mobs            []Home TENS  []Iontophoresis    []Orthotic casting/fitting      []Dry Needling    Assessment  Assessment: Pt. cont. to report 10/10 pain with no improvement in shoulder pain with therapy and returned to doctor for follow-up. No further PT visits were scheduled. Will d/c patient at this time.              Goals  Short term goals  Time Frame for Short term goals: 3 weeks  Short term goal 1: Pt. to initiate home program - met  Short term goal 2: Pt. to have only min tenderness at R biceps tendon  Short term goal 3: Pt. to demo increased AAROM of R shoulder to 100* flex/abd, ER to 30* with no increase in pain to improve functional mobility - met (flex: 105*, ER: 30* with cane in sitting)    Long term goals  Time Frame for Long term goals : 8 weeks  Long term goal 1: Pt. to be independent with HEP  Long term goal 2: Pt. to demo 4/5 R shoulder strength all planes to

## 2019-01-24 ENCOUNTER — HOSPITAL ENCOUNTER (EMERGENCY)
Age: 49
Discharge: HOME | End: 2019-01-24
Payer: MEDICARE

## 2019-01-24 VITALS
BODY MASS INDEX: 35.12 KG/M2 | SYSTOLIC BLOOD PRESSURE: 147 MMHG | DIASTOLIC BLOOD PRESSURE: 83 MMHG | RESPIRATION RATE: 18 BRPM | HEIGHT: 61 IN | OXYGEN SATURATION: 99 % | TEMPERATURE: 96.6 F | WEIGHT: 186 LBS | HEART RATE: 62 BPM

## 2019-01-24 DIAGNOSIS — I10 ESSENTIAL HYPERTENSION: Primary | ICD-10-CM

## 2019-01-24 LAB
ABSOLUTE EOS #: 0.36 K/UL (ref 0–0.44)
ABSOLUTE IMMATURE GRANULOCYTE: 0.05 K/UL (ref 0–0.3)
ABSOLUTE LYMPH #: 2.92 K/UL (ref 1.1–3.7)
ABSOLUTE MONO #: 0.87 K/UL (ref 0.1–1.2)
ALBUMIN SERPL-MCNC: 4.4 G/DL (ref 3.5–5.2)
ALBUMIN/GLOBULIN RATIO: 1.3 (ref 1–2.5)
ALP BLD-CCNC: 119 U/L (ref 35–104)
ALT SERPL-CCNC: 23 U/L (ref 5–33)
ANION GAP SERPL CALCULATED.3IONS-SCNC: 13 MMOL/L (ref 9–17)
AST SERPL-CCNC: 19 U/L
BASOPHILS # BLD: 1 % (ref 0–2)
BASOPHILS ABSOLUTE: 0.06 K/UL (ref 0–0.2)
BILIRUB SERPL-MCNC: 0.17 MG/DL (ref 0.3–1.2)
BUN BLDV-MCNC: 8 MG/DL (ref 6–20)
BUN/CREAT BLD: 20 (ref 9–20)
CALCIUM SERPL-MCNC: 9.8 MG/DL (ref 8.6–10.4)
CHLORIDE BLD-SCNC: 99 MMOL/L (ref 98–107)
CO2: 24 MMOL/L (ref 20–31)
CREAT SERPL-MCNC: 0.41 MG/DL (ref 0.5–0.9)
DIFFERENTIAL TYPE: ABNORMAL
EKG ATRIAL RATE: 72 BPM
EKG P AXIS: 51 DEGREES
EKG P-R INTERVAL: 142 MS
EKG Q-T INTERVAL: 378 MS
EKG QRS DURATION: 102 MS
EKG QTC CALCULATION (BAZETT): 413 MS
EKG R AXIS: 60 DEGREES
EKG T AXIS: 26 DEGREES
EKG VENTRICULAR RATE: 72 BPM
EOSINOPHILS RELATIVE PERCENT: 3 % (ref 1–4)
GFR AFRICAN AMERICAN: >60 ML/MIN
GFR NON-AFRICAN AMERICAN: >60 ML/MIN
GFR SERPL CREATININE-BSD FRML MDRD: ABNORMAL ML/MIN/{1.73_M2}
GFR SERPL CREATININE-BSD FRML MDRD: ABNORMAL ML/MIN/{1.73_M2}
GLUCOSE BLD-MCNC: 96 MG/DL (ref 70–99)
HCT VFR BLD CALC: 43.2 % (ref 36.3–47.1)
HEMOGLOBIN: 14.2 G/DL (ref 11.9–15.1)
IMMATURE GRANULOCYTES: 1 %
LYMPHOCYTES # BLD: 27 % (ref 24–43)
MCH RBC QN AUTO: 29.4 PG (ref 25.2–33.5)
MCHC RBC AUTO-ENTMCNC: 32.9 G/DL (ref 28.4–34.8)
MCV RBC AUTO: 89.4 FL (ref 82.6–102.9)
MONOCYTES # BLD: 8 % (ref 3–12)
NRBC AUTOMATED: 0 PER 100 WBC
PDW BLD-RTO: 13 % (ref 11.8–14.4)
PLATELET # BLD: 373 K/UL (ref 138–453)
PLATELET ESTIMATE: ABNORMAL
PMV BLD AUTO: 9.6 FL (ref 8.1–13.5)
POTASSIUM SERPL-SCNC: 4 MMOL/L (ref 3.7–5.3)
RBC # BLD: 4.83 M/UL (ref 3.95–5.11)
RBC # BLD: ABNORMAL 10*6/UL
SEG NEUTROPHILS: 60 % (ref 36–65)
SEGMENTED NEUTROPHILS ABSOLUTE COUNT: 6.7 K/UL (ref 1.5–8.1)
SODIUM BLD-SCNC: 136 MMOL/L (ref 135–144)
TOTAL PROTEIN: 7.9 G/DL (ref 6.4–8.3)
WBC # BLD: 11 K/UL (ref 3.5–11.3)
WBC # BLD: ABNORMAL 10*3/UL

## 2019-01-24 PROCEDURE — 93005 ELECTROCARDIOGRAM TRACING: CPT

## 2019-01-24 PROCEDURE — 36415 COLL VENOUS BLD VENIPUNCTURE: CPT

## 2019-01-24 PROCEDURE — 99283 EMERGENCY DEPT VISIT LOW MDM: CPT

## 2019-01-24 PROCEDURE — 85025 COMPLETE CBC W/AUTO DIFF WBC: CPT

## 2019-01-24 PROCEDURE — 80053 COMPREHEN METABOLIC PANEL: CPT

## 2019-01-24 ASSESSMENT — ENCOUNTER SYMPTOMS: NAUSEA: 0

## 2019-05-04 DIAGNOSIS — G40.209 PARTIAL EPILEPSY WITH IMPAIRMENT OF CONSCIOUSNESS (HCC): ICD-10-CM

## 2019-05-07 RX ORDER — OXCARBAZEPINE 150 MG/1
TABLET, FILM COATED ORAL
Qty: 210 TABLET | Refills: 5 | Status: SHIPPED | OUTPATIENT
Start: 2019-05-07 | End: 2019-08-13 | Stop reason: SDUPTHER

## 2019-06-07 ENCOUNTER — APPOINTMENT (OUTPATIENT)
Dept: GENERAL RADIOLOGY | Age: 49
End: 2019-06-07
Payer: MEDICARE

## 2019-06-07 ENCOUNTER — HOSPITAL ENCOUNTER (EMERGENCY)
Age: 49
Discharge: HOME | End: 2019-06-07
Attending: EMERGENCY MEDICINE
Payer: MEDICARE

## 2019-06-07 VITALS
BODY MASS INDEX: 34.01 KG/M2 | DIASTOLIC BLOOD PRESSURE: 99 MMHG | TEMPERATURE: 98.7 F | RESPIRATION RATE: 20 BRPM | WEIGHT: 180 LBS | OXYGEN SATURATION: 99 % | SYSTOLIC BLOOD PRESSURE: 190 MMHG | HEART RATE: 70 BPM

## 2019-06-07 DIAGNOSIS — S99.921A INJURY OF TOE ON RIGHT FOOT, INITIAL ENCOUNTER: Primary | ICD-10-CM

## 2019-06-07 PROCEDURE — 73630 X-RAY EXAM OF FOOT: CPT

## 2019-06-07 PROCEDURE — 99283 EMERGENCY DEPT VISIT LOW MDM: CPT

## 2019-06-07 PROCEDURE — 6370000000 HC RX 637 (ALT 250 FOR IP): Performed by: EMERGENCY MEDICINE

## 2019-06-07 RX ORDER — ACETAMINOPHEN 500 MG
1000 TABLET ORAL ONCE
Status: COMPLETED | OUTPATIENT
Start: 2019-06-07 | End: 2019-06-07

## 2019-06-07 RX ADMIN — ACETAMINOPHEN 1000 MG: 500 TABLET, FILM COATED ORAL at 10:01

## 2019-06-07 ASSESSMENT — ENCOUNTER SYMPTOMS
WHEEZING: 0
SHORTNESS OF BREATH: 0
DIARRHEA: 0
SORE THROAT: 0
RHINORRHEA: 0
COUGH: 0
CONSTIPATION: 0
ABDOMINAL DISTENTION: 0
NAUSEA: 0
VOMITING: 0

## 2019-06-07 ASSESSMENT — PAIN SCALES - GENERAL: PAINLEVEL_OUTOF10: 8

## 2019-06-07 ASSESSMENT — PAIN SCALES - WONG BAKER
WONGBAKER_NUMERICALRESPONSE: 8
WONGBAKER_NUMERICALRESPONSE: 8

## 2019-06-07 ASSESSMENT — PAIN DESCRIPTION - PAIN TYPE: TYPE: ACUTE PAIN

## 2019-06-07 ASSESSMENT — PAIN - FUNCTIONAL ASSESSMENT: PAIN_FUNCTIONAL_ASSESSMENT: 0-10

## 2019-06-07 ASSESSMENT — PAIN DESCRIPTION - LOCATION: LOCATION: FOOT

## 2019-06-07 ASSESSMENT — PAIN DESCRIPTION - ORIENTATION: ORIENTATION: RIGHT

## 2019-06-07 NOTE — ED PROVIDER NOTES
insecurity:     Worry: Not on file     Inability: Not on file    Transportation needs:     Medical: Not on file     Non-medical: Not on file   Tobacco Use    Smoking status: Never Smoker    Smokeless tobacco: Never Used   Substance and Sexual Activity    Alcohol use: No    Drug use: No    Sexual activity: Never   Lifestyle    Physical activity:     Days per week: Not on file     Minutes per session: Not on file    Stress: Not on file   Relationships    Social connections:     Talks on phone: Not on file     Gets together: Not on file     Attends [employer] service: Not on file     Active member of club or organization: Not on file     Attends meetings of clubs or organizations: Not on file     Relationship status: Not on file    Intimate partner violence:     Fear of current or ex partner: Not on file     Emotionally abused: Not on file     Physically abused: Not on file     Forced sexual activity: Not on file   Other Topics Concern    Not on file   Social History Narrative    Not on file       Family History   Problem Relation Age of Onset    Hypertension Mother     High Cholesterol Mother     High Blood Pressure Mother     Arthritis Mother     No Known Problems Father        Allergies:  Lisinopril-hydrochlorothiazide and Morphine    Home Medications:  Prior to Admission medications    Medication Sig Start Date End Date Taking?  Authorizing Provider   OXcarbazepine (TRILEPTAL) 150 MG tablet TAKE 3 TABLETS BY MOUTH EVERY MORNING AND 4 TABLETS EVERY EVENING. 5/7/19  Yes Wicho Renee MD   carvedilol (COREG) 12.5 MG tablet TAKE 1 TABLET BY MOUTH TWICE A DAY 3/19/19  Yes HERBIE Rondon CNP   lactulose (CONSTULOSE) 10 GM/15ML solution Take 30 mLs by mouth 2 times daily 3/12/19  Yes HERBIE Rondon CNP   atorvastatin (LIPITOR) 20 MG tablet Take 1 tablet by mouth nightly 1/10/19  Yes HERBIE Rondon CNP   montelukast (SINGULAIR) 10 MG tablet Take 1 tablet by mouth nightly 1/10/19  Yes Brian Kessler, APRN - CNP   aspirin 81 MG chewable tablet Take 81 mg by mouth   Yes Historical Provider, MD   Multiple Vitamin (MULTIVITAMIN PO) Take 1 tablet by mouth daily    Yes Historical Provider, MD   CALCIUM CITRATE Take 1 tablet by mouth 2 times daily chewable   Yes Historical Provider, MD   Docusate Calcium (STOOL SOFTENER PO) Take 1 tablet by mouth 2 times daily    Yes Historical Provider, MD       REVIEW OF SYSTEMS    (2-9 systems for level 4, 10 or more for level 5)      Review of Systems   Constitutional: Negative for activity change, appetite change, fatigue and fever. HENT: Negative for congestion, rhinorrhea and sore throat. Respiratory: Negative for cough, shortness of breath and wheezing. Cardiovascular: Negative for chest pain, palpitations and leg swelling. Gastrointestinal: Negative for abdominal distention, constipation, diarrhea, nausea and vomiting. Genitourinary: Negative for decreased urine volume and dysuria. Musculoskeletal: Positive for joint swelling. Skin: Positive for wound. Negative for rash. Neurological: Negative for dizziness, weakness, light-headedness, numbness and headaches. PHYSICAL EXAM   (up to 7 for level 4, 8 or more for level 5)     INITIAL VITALS:   BP (!) 197/113   Pulse 70   Temp 98.7 °F (37.1 °C) (Tympanic)   Resp 20   Wt 180 lb (81.6 kg)   SpO2 99%   BMI 34.01 kg/m²     Physical Exam   Constitutional: She is oriented to person, place, and time. Well-appearing nontoxic sitting in a wheelchair. HENT:   Head: Normocephalic and atraumatic. Eyes: Conjunctivae are normal. Right eye exhibits no discharge. Left eye exhibits no discharge. Cardiovascular: Normal rate, regular rhythm and normal heart sounds. Exam reveals no gallop and no friction rub. No murmur heard. Pulmonary/Chest: Effort normal and breath sounds normal. No respiratory distress. She has no wheezes. She has no rales.  She exhibits no tenderness. Abdominal: Soft. She exhibits no distension and no mass. There is no tenderness. There is no rebound and no guarding. Musculoskeletal:   Patient does have edema noted to her right big toe, with tenderness to palpation. She does have some blood noted to the under side of the big toe nail. That is dried blood. There is no subungual hematoma. She does have tenderness to palpation with manipulation of the nail, which does have some laxity noted. But still appears to be intact. Tenderness also to the proximal phalanx, and MTP joint. And on to the metatarsal first and second. She does have a 2+ pedal pulse, capillary refill less than 2 seconds, and sensation to light touch intact, no pain with movement at the ankle joint. Neurological: She is alert and oriented to person, place, and time. Skin: Skin is warm and dry. No rash noted. Nursing note and vitals reviewed. DIFFERENTIAL  DIAGNOSIS     Patient with trauma to her right toe, looks like part of the toenail has been lifted up, but overall is still intact. We'll do Hibiclens soak, Tylenol for pain, and we'll get x-rays there is additional swelling on the dorsum of the foot. PLAN (LABS / IMAGING / EKG):  Orders Placed This Encounter   Procedures    XR FOOT RIGHT (MIN 3 VIEWS)    Apply ice to affected area    Apply ice to affected area       MEDICATIONS ORDERED:  Orders Placed This Encounter   Medications    acetaminophen (TYLENOL) tablet 1,000 mg       DIAGNOSTIC RESULTS / EMERGENCY DEPARTMENT COURSE / MDM     LABS:  No results found for this visit on 06/07/19. IMPRESSION: Toe pain    RADIOLOGY:  XR FOOT RIGHT (MIN 3 VIEWS)   Final Result   Diffuse soft tissue swelling of the mid to distal foot. No acute osseous   abnormality.                    EMERGENCY DEPARTMENT COURSE:  Toe was soaked, there does appear to be some laxity in the toenail but it is still secured in place, will leave in place and not removed, will apply bandage. X-ray was reviewed that does show the swelling, but no fractures noted. Patient is not ambulatory, will continue with Tylenol for pain control, elevate and ice. FINAL IMPRESSION      1.  Injury of toe on right foot, initial encounter          DISPOSITION / PLAN     DISPOSITION Decision To Discharge 06/07/2019 10:58:47 AM      PATIENT REFERRED TO:  [place] [address], [address] [address]  [phone]    Schedule an appointment as soon as possible for a visit in 3 days        DISCHARGE MEDICATIONS:  New Prescriptions    No medications on file       Bryan Parrish  11:00 AM    Attending Emergency Physician  [facility]    (Please note that portions of this note were completed with a voice recognition program.  Effortswere made to edit the dictations but occasionally words are mis-transcribed.)              Ne[facility] Diabetes CenterDO  06/07/19 1243

## 2019-07-29 ENCOUNTER — HOSPITAL ENCOUNTER (EMERGENCY)
Age: 49
Discharge: HOME | End: 2019-07-29
Payer: MEDICARE

## 2019-07-29 ENCOUNTER — APPOINTMENT (OUTPATIENT)
Dept: CT IMAGING | Age: 49
End: 2019-07-29
Payer: MEDICARE

## 2019-07-29 VITALS
RESPIRATION RATE: 16 BRPM | SYSTOLIC BLOOD PRESSURE: 179 MMHG | HEART RATE: 60 BPM | DIASTOLIC BLOOD PRESSURE: 96 MMHG | OXYGEN SATURATION: 100 %

## 2019-07-29 DIAGNOSIS — S70.01XA CONTUSION OF RIGHT HIP, INITIAL ENCOUNTER: ICD-10-CM
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
## 2019-07-29 DIAGNOSIS — S20.211A CONTUSION OF RIGHT CHEST WALL, INITIAL ENCOUNTER: ICD-10-CM

## 2019-07-29 PROCEDURE — 71250 CT THORAX DX C-: CPT

## 2019-07-29 PROCEDURE — 99283 EMERGENCY DEPT VISIT LOW MDM: CPT

## 2019-07-29 ASSESSMENT — PAIN DESCRIPTION - PAIN TYPE: TYPE: ACUTE PAIN

## 2019-07-29 ASSESSMENT — ENCOUNTER SYMPTOMS
EYE REDNESS: 0
WHEEZING: 0
ABDOMINAL PAIN: 0
EYE DISCHARGE: 0
SHORTNESS OF BREATH: 0
BLOOD IN STOOL: 0
COUGH: 0
NAUSEA: 0
RHINORRHEA: 0
BACK PAIN: 0
SORE THROAT: 0
CONSTIPATION: 0
VOMITING: 0
DIARRHEA: 0
CHEST TIGHTNESS: 0

## 2019-07-29 ASSESSMENT — PAIN DESCRIPTION - LOCATION: LOCATION: HIP

## 2019-07-29 ASSESSMENT — PAIN DESCRIPTION - ORIENTATION: ORIENTATION: RIGHT

## 2019-07-29 ASSESSMENT — PAIN DESCRIPTION - DESCRIPTORS: DESCRIPTORS: ACHING

## 2019-07-29 ASSESSMENT — PAIN DESCRIPTION - ONSET: ONSET: SUDDEN

## 2019-07-29 ASSESSMENT — PAIN SCALES - GENERAL: PAINLEVEL_OUTOF10: 10

## 2019-07-29 ASSESSMENT — PAIN DESCRIPTION - FREQUENCY: FREQUENCY: CONTINUOUS

## 2019-07-29 NOTE — ED PROVIDER NOTES
organizations: Not on file     Relationship status: Not on file    Intimate partner violence:     Fear of current or ex partner: Not on file     Emotionally abused: Not on file     Physically abused: Not on file     Forced sexual activity: Not on file   Other Topics Concern    Not on file   Social History Narrative    Not on file       SCREENINGS    [place] Coma Scale  Eye Opening: Spontaneous  Best Verbal Response: Oriented  Best Motor Response: Obeys commands  [place] Coma Scale Score: 15 @FLOW([id number])@      PHYSICAL EXAM    (up to 7 for level 4, 8 or more for level 5)     ED Triage Vitals [07/29/19 1400]   BP Temp Temp src Pulse Resp SpO2 Height Weight   (!) 162/119 -- -- 65 16 100 % -- --       Physical Exam   Constitutional: She is oriented to person, place, and time. She appears well-developed and well-nourished. No distress. She is not intubated. HENT:   Head: Normocephalic and atraumatic. Right Ear: External ear normal.   Left Ear: External ear normal.   Mouth/Throat: Oropharynx is clear and moist. No oropharyngeal exudate. Eyes: Pupils are equal, round, and reactive to light. Conjunctivae and EOM are normal. Right eye exhibits no discharge. Left eye exhibits no discharge. No scleral icterus. Neck: Normal range of motion and full passive range of motion without pain. Neck supple. No spinous process tenderness and no muscular tenderness present. No neck rigidity. No tracheal deviation, no edema, no erythema and normal range of motion present. Cardiovascular: Normal rate, regular rhythm and intact distal pulses. Exam reveals no gallop and no friction rub. No murmur heard. Pulmonary/Chest: Effort normal and breath sounds normal. No accessory muscle usage or stridor. No apnea, no tachypnea and no bradypnea. She is not intubated. No respiratory distress. She has no decreased breath sounds. She has no wheezes. She has no rhonchi. She has no rales. She exhibits tenderness and bony tenderness.  She

## 2019-08-13 ENCOUNTER — OFFICE VISIT (OUTPATIENT)
Dept: NEUROLOGY | Age: 49
End: 2019-08-13
Payer: MEDICARE

## 2019-08-13 VITALS
SYSTOLIC BLOOD PRESSURE: 152 MMHG | HEART RATE: 56 BPM | WEIGHT: 195 LBS | BODY MASS INDEX: 36.82 KG/M2 | HEIGHT: 61 IN | DIASTOLIC BLOOD PRESSURE: 85 MMHG

## 2019-08-13 DIAGNOSIS — I67.9 CEREBROVASCULAR DISEASE: ICD-10-CM

## 2019-08-13 DIAGNOSIS — G40.209 PARTIAL EPILEPSY WITH IMPAIRMENT OF CONSCIOUSNESS (HCC): Primary | ICD-10-CM

## 2019-08-13 DIAGNOSIS — R03.0 ELEVATED BLOOD PRESSURE READING: ICD-10-CM

## 2019-08-13 DIAGNOSIS — H54.7 BLINDNESS: ICD-10-CM

## 2019-08-13 PROCEDURE — 1036F TOBACCO NON-USER: CPT | Performed by: PSYCHIATRY & NEUROLOGY

## 2019-08-13 PROCEDURE — 99214 OFFICE O/P EST MOD 30 MIN: CPT | Performed by: PSYCHIATRY & NEUROLOGY

## 2019-08-13 PROCEDURE — G8417 CALC BMI ABV UP PARAM F/U: HCPCS | Performed by: PSYCHIATRY & NEUROLOGY

## 2019-08-13 PROCEDURE — G8427 DOCREV CUR MEDS BY ELIG CLIN: HCPCS | Performed by: PSYCHIATRY & NEUROLOGY

## 2019-08-13 PROCEDURE — 3017F COLORECTAL CA SCREEN DOC REV: CPT | Performed by: PSYCHIATRY & NEUROLOGY

## 2019-08-13 RX ORDER — OXCARBAZEPINE 150 MG/1
TABLET, FILM COATED ORAL
Qty: 210 TABLET | Refills: 9 | Status: SHIPPED | OUTPATIENT
Start: 2019-08-13 | End: 2020-07-01 | Stop reason: SDUPTHER

## 2019-08-13 RX ORDER — ASPIRIN 81 MG/1
81 TABLET, CHEWABLE ORAL DAILY
Qty: 30 TABLET | Refills: 12 | Status: SHIPPED | OUTPATIENT
Start: 2019-08-13 | End: 2021-05-20 | Stop reason: SDUPTHER

## 2019-08-13 RX ORDER — ATORVASTATIN CALCIUM 20 MG/1
20 TABLET, FILM COATED ORAL NIGHTLY
Qty: 90 TABLET | Refills: 1 | Status: SHIPPED | OUTPATIENT
Start: 2019-08-13 | End: 2019-12-04 | Stop reason: SDUPTHER

## 2019-08-13 NOTE — PROGRESS NOTES
CEREBELLAR FUNCTION:  Intact fine motor control over upper limbs   REFLEX FUNCTION:  Symmetric, no perverted reflex, no Babinski sign   STATION and GAIT  Normal station, able to walk with walker       Diagnostic data reviewed with the patient and her mom:   EEG (6-17-15): EEG performed during wakefulness did not demonstrate any ongoing  electrographic seizure activity. No epileptiform discharges noted. No  electrographic seizures noted. Trileptal level (8/3/16);  13.6 (10-35)      CT Head (8-28-14): No acute intracranial abnormality. Lipid profile (8/3/16): Cholesterol 166,  LDL 88, HDL 52. CBC:     Lab Results   Component Value Date    WBC 9.4 06/17/2019    HGB 13.8 06/17/2019     06/17/2019      BMP:     Lab Results   Component Value Date     06/17/2019    K 4.1 06/17/2019    GLUCOSE 92 06/17/2019    CALCIUM 9.6 06/17/2019       No results found for: PHENYTOIN, PHENOBARB, VALPROATE, CBMZ    Lab Results   Component Value Date    CHOL 178 06/17/2019    HDL 47 06/17/2019    TRIG 182 (H) 06/17/2019    ALT 26 06/17/2019    AST 17 06/17/2019    TSH 1.330 01/24/2018    LABA1C 5.6 (H) 06/17/2019       LFT: 6/17/19:  AST 17, ALT 26              Impression and Plan: Ms. Naz Hamilton is a 47 y.o. female with     Complex Partial Epilepsy: stable without any seizure since 2009. Most recent EEG (June 2015)  was unremarkable. Sodium level, CBC, AST, ALT from June 2019 are unremarkable as above. Discussion about Trileptal downward taper: Patient was extremely agitated and patient's mom is requesting not to make any changes in Trileptal dose and they do not want to consider trial of downward taper of Trileptal.      Elevated blood pressure reading: hx of \"white coat syndrome\". ,  Presently on Carvedilol; patient's mom was advised to keep a log of periodic blood pressures at home and check with PCP; they voiced understanding those instructions.     Counseling done regarding avoidance of seizure precipitating factors such as sleep deprivation and compliance with medications, etc. Complications and sequelae of uncontrolled seizures also discussed. Activity (bathing, swimming, etc)  restrictions also discussed. CVA, unspecified; Bilateral occipital atrophy with legal blindness: on ASA, Pravastatin for stroke prophylaxis. Hyperlipidemia with elevated LDL at 120 (with target LDL < 100): after switch from Pravastatin to Atorvastatin; LDL level has improved to 91 as above. Follow up in  1 year. Please note that portions of this note were completed with a voice recognition program.  Although every effort was made to ensure the accuracy of this  automated transcription, some errors in transcription may have occurred, occasionally words are mis-transcribed.

## 2020-06-26 ENCOUNTER — HOSPITAL ENCOUNTER (OUTPATIENT)
Dept: NON INVASIVE DIAGNOSTICS | Age: 50
Discharge: HOME | End: 2020-06-26
Payer: MEDICARE

## 2020-06-26 LAB
LV EF: 60 %
LVEF MODALITY: NORMAL

## 2020-06-26 PROCEDURE — 93306 TTE W/DOPPLER COMPLETE: CPT

## 2020-08-17 RX ORDER — FUROSEMIDE 40 MG/1
40 TABLET ORAL DAILY
COMMUNITY
End: 2021-01-08

## 2020-08-17 RX ORDER — ESCITALOPRAM OXALATE 10 MG/1
10 TABLET ORAL DAILY
COMMUNITY
End: 2020-10-05

## 2020-08-17 NOTE — PROGRESS NOTES
NEUROLOGY FOLLOW-UP  Patient Name:  Christelle Sellers  :   1970  Clinic Visit Date: 2020        REVIEW OF SYSTEMS  Constitutional Weight changes: absent, Fevers : absent, Fatigue: absent; Any recent hospitalizations:  absent.    HEENT Ears: normal, Eyes: legally blind, Visual disturbance: absent   Reespiratory Shortness of breath: absent, Cough: absent   Cardivascular Chest pain: absent, Leg swelling :absent   GI Constipation: absent, Diarrhea: absent, Swallowing change: absent    Urinary frequency: absent, Urinary urgency: absent, Urinary incontinence: absent   Musculoskeletal Neck pain: absent, Back pain: absent, Stiffness: absent, Muscle pain: absent, Joint pain: absent   Dermatological Hair loss: absent, Skin changes: absent   Neurological Memory loss: absent, Confusion: absent, Seizures: present; Trouble walking or imbalance: present, Dizziness: absent, Weakness: absent, Numbness absent, Tremor: absent, Spasm: absent, Speech difficulty: absent, Headache: absent, Light sensitivity: absent   Psychiatric Anxiety: present, Depression  absent, Suicidal ideations absent   Hematologic Abnormal bleeding: absent, Anemia: absent, Clotting disorder: absent, Lymph gland changes: absent
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
## 2020-08-18 ENCOUNTER — TELEMEDICINE (OUTPATIENT)
Dept: NEUROLOGY | Age: 50
End: 2020-08-18
Payer: MEDICARE

## 2020-08-18 PROCEDURE — 3017F COLORECTAL CA SCREEN DOC REV: CPT | Performed by: PSYCHIATRY & NEUROLOGY

## 2020-08-18 PROCEDURE — 99214 OFFICE O/P EST MOD 30 MIN: CPT | Performed by: PSYCHIATRY & NEUROLOGY

## 2020-08-18 PROCEDURE — G8427 DOCREV CUR MEDS BY ELIG CLIN: HCPCS | Performed by: PSYCHIATRY & NEUROLOGY

## 2020-08-18 RX ORDER — OXCARBAZEPINE 150 MG/1
TABLET, FILM COATED ORAL
Qty: 210 TABLET | Refills: 1 | Status: SHIPPED | OUTPATIENT
Start: 2020-08-18 | End: 2021-01-08

## 2020-08-25 LAB
ABSOLUTE BASO #: 0 X10E9/L (ref 0–0.2)
ABSOLUTE EOS #: 0.3 X10E9/L (ref 0–0.4)
ABSOLUTE LYMPH #: 3.6 X10E9/L (ref 1–3.5)
ABSOLUTE MONO #: 0.8 X10E9/L (ref 0–0.9)
ABSOLUTE NEUT #: 5.4 X10E9/L (ref 1.5–6.6)
ALT SERPL-CCNC: 30 U/L (ref 0–31)
AST SERPL-CCNC: 24 U/L (ref 0–41)
BASOPHILS RELATIVE PERCENT: 0.2 %
EOSINOPHILS RELATIVE PERCENT: 3.1 %
HCT VFR BLD CALC: 40.2 % (ref 35–47)
HEMOGLOBIN: 13.7 G/DL (ref 11.7–15.5)
LYMPHOCYTE %: 35.2 %
MCH RBC QN AUTO: 30.3 PG (ref 27–34)
MCHC RBC AUTO-ENTMCNC: 34.2 G/DL (ref 32–36)
MCV RBC AUTO: 89 FL (ref 80–100)
MONOCYTES # BLD: 8.2 %
NEUTROPHILS RELATIVE PERCENT: 53.3 %
PDW BLD-RTO: 13.9 % (ref 11.5–15)
PLATELETS: 346 X10E9/L (ref 150–450)
PMV BLD AUTO: 8.6 FL (ref 7–12)
RBC: 4.53 X10E12/L (ref 3.8–5.2)
WBC: 10.1 X10E9/L (ref 4–11)

## 2021-02-18 ENCOUNTER — TELEMEDICINE (OUTPATIENT)
Dept: NEUROLOGY | Age: 51
End: 2021-02-18
Payer: MEDICARE

## 2021-02-18 DIAGNOSIS — Z51.81 MEDICATION MONITORING ENCOUNTER: ICD-10-CM

## 2021-02-18 DIAGNOSIS — I67.9 CEREBROVASCULAR DISEASE: ICD-10-CM

## 2021-02-18 DIAGNOSIS — R26.9 GAIT DIFFICULTY: ICD-10-CM

## 2021-02-18 DIAGNOSIS — H54.40 BLINDNESS OF LEFT EYE, UNSPECIFIED RIGHT EYE VISUAL IMPAIRMENT CATEGORY: ICD-10-CM

## 2021-02-18 DIAGNOSIS — G40.209 PARTIAL EPILEPSY WITH IMPAIRMENT OF CONSCIOUSNESS (HCC): Primary | ICD-10-CM

## 2021-02-18 PROCEDURE — 99214 OFFICE O/P EST MOD 30 MIN: CPT | Performed by: PSYCHIATRY & NEUROLOGY

## 2021-02-18 PROCEDURE — G8427 DOCREV CUR MEDS BY ELIG CLIN: HCPCS | Performed by: PSYCHIATRY & NEUROLOGY

## 2021-02-18 PROCEDURE — 3017F COLORECTAL CA SCREEN DOC REV: CPT | Performed by: PSYCHIATRY & NEUROLOGY

## 2021-02-18 RX ORDER — OXCARBAZEPINE 150 MG/1
TABLET, FILM COATED ORAL
Qty: 210 TABLET | Refills: 6 | Status: SHIPPED | OUTPATIENT
Start: 2021-02-18 | End: 2021-05-06 | Stop reason: SDUPTHER
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
## 2021-02-18 NOTE — PROGRESS NOTES
All items selected indicate a positive finding. Those items not selected are negative.   Constitutional [] Weight loss/gain   [x] Fatigue  [] Fever/Chills   HEENT [] Hearing Loss  [] Visual Disturbance  [] Tinnitus  [] Eye pain   Respiratory [] Shortness of Breath  [] Cough  [] Snoring   Cardiovascular [] Chest Pain  [] Palpitations  [] Lightheaded   GI [] Constipation  [] Diarrhea  [] Swallowing change  [] Nausea/vomiting    [] Urinary Frequency  [] Urinary Urgency   Musculoskeletal [] Neck pain  [] Back pain  [] Muscle pain  [] Restless legs   Dermatologic [] Skin changes   Neurologic [] Memory loss/confusion  [x] Seizures  [x] Trouble walking or imbalance  [] Dizziness  [] Sleep disturbance  [] Weakness  [] Numbness  [] Tremors  [] Speech Difficulty  [] Headaches  [] Light Sensitivity  [] Sound Sensitivity   Endocrinology []Excessive thirst  []Excessive hunger   Psychiatric [] Anxiety/Depression  [] Hallucination   Allergy/immunology []Hives/environmental allergies   Hematologic/lymph [] Abnormal bleeding  [] Abnormal bruising

## 2021-04-22 DIAGNOSIS — G40.209 PARTIAL EPILEPSY WITH IMPAIRMENT OF CONSCIOUSNESS (HCC): ICD-10-CM

## 2021-04-22 NOTE — TELEPHONE ENCOUNTER
Mother called the office stating that she would be having some upcoming heart surgery and Jony Granger would need to go in to a group home. Mother states that the group home won't accept medication from home, instead needing new prescriptions. I asked the name of the group home and the pharmacy that they use. Mother stated that she didn't have this information. She would check in to this and call me back.

## 2021-05-06 NOTE — TELEPHONE ENCOUNTER
Sonia Tarango, patient's mother, called the office this morning to request a new Oxcarbazepine Rx. She stated that that Roslyn Joe will be moving in to the group home on 5/20/2021. They require all her medications be sent to [facility]. I placed a call to the new pharmacy and confirmed with Tristen Tena that the Rx could be sent in early.   Rx will be generated and sent to Dr. Zenobia Avalos for his approval.          Medication active on med list: yes  Date of last fill: 2/18/21  verified on 5/6/2021    verified by Jaydon Courser., LPN  Date of last appointment: 2/18/2021  Next Visit Date:  8/24/2021

## 2021-05-07 RX ORDER — OXCARBAZEPINE 150 MG/1
TABLET, FILM COATED ORAL
Qty: 210 TABLET | Refills: 3 | Status: SHIPPED | OUTPATIENT
Start: 2021-05-07 | End: 2021-08-27 | Stop reason: SDUPTHER

## 2021-05-27 PROBLEM — E66.01 CLASS 2 SEVERE OBESITY DUE TO EXCESS CALORIES WITH SERIOUS COMORBIDITY AND BODY MASS INDEX (BMI) OF 37.0 TO 37.9 IN ADULT (HCC): Status: ACTIVE | Noted: 2021-05-27

## 2021-05-27 PROBLEM — E66.812 CLASS 2 SEVERE OBESITY DUE TO EXCESS CALORIES WITH SERIOUS COMORBIDITY AND BODY MASS INDEX (BMI) OF 37.0 TO 37.9 IN ADULT: Status: ACTIVE | Noted: 2021-05-27

## 2021-08-27 ENCOUNTER — VIRTUAL VISIT (OUTPATIENT)
Dept: NEUROLOGY | Age: 51
End: 2021-08-27
Payer: MEDICARE

## 2021-08-27 DIAGNOSIS — G40.209 PARTIAL EPILEPSY WITH IMPAIRMENT OF CONSCIOUSNESS (HCC): Primary | ICD-10-CM

## 2021-08-27 DIAGNOSIS — H54.40 BLINDNESS OF LEFT EYE, UNSPECIFIED RIGHT EYE VISUAL IMPAIRMENT CATEGORY: ICD-10-CM

## 2021-08-27 DIAGNOSIS — Z51.81 MEDICATION MONITORING ENCOUNTER: ICD-10-CM

## 2021-08-27 DIAGNOSIS — I67.9 CEREBROVASCULAR DISEASE: ICD-10-CM

## 2021-08-27 PROCEDURE — 99214 OFFICE O/P EST MOD 30 MIN: CPT | Performed by: PSYCHIATRY & NEUROLOGY

## 2021-08-27 PROCEDURE — 3017F COLORECTAL CA SCREEN DOC REV: CPT | Performed by: PSYCHIATRY & NEUROLOGY

## 2021-08-27 PROCEDURE — G8428 CUR MEDS NOT DOCUMENT: HCPCS | Performed by: PSYCHIATRY & NEUROLOGY

## 2021-08-27 RX ORDER — ASPIRIN 81 MG/1
81 TABLET, CHEWABLE ORAL DAILY
Qty: 90 TABLET | Refills: 1 | Status: SHIPPED | OUTPATIENT
Start: 2021-08-27 | End: 2022-08-30 | Stop reason: SDUPTHER

## 2021-08-27 RX ORDER — OXCARBAZEPINE 150 MG/1
TABLET, FILM COATED ORAL
Qty: 210 TABLET | Refills: 3 | Status: SHIPPED | OUTPATIENT
Start: 2021-08-27 | End: 2022-03-10 | Stop reason: SDUPTHER

## 2021-08-27 NOTE — PROGRESS NOTES
[facility] Neurology Telehealth Followup Visit    Pt Name: Kevan Eagle  MRN: [id number]  YOB: 1970  Date of evaluation: 8/27/2021    Last visit Date: 2/18/2021  Primary Care Physician: HERBIE Linder CNP  Reason for Evaluation: TELEHEALTH EVALUATION -- Audio/Visual (During XYHLT-29 public health emergency)      Dear HERBIE Turpin CNP    I saw Ms. Kevan Eagle in virtual visit follow-up today in continuation of neurologic care. Her mom at bedside. She is a 45 y.o.  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 Her hx is also significant for white coat syndrome. Patient's mother and caretaker, Tamia Vargas, spoke mostly for the patient. She reports she put the patient in a group home because she had heart surgery and cannot care for her. Tamiakatelyn Vargas states the patient is doing well at the group home with alone time as well as interaction with other patients. Tamiakatelyn Vargas reports no observed seizures or auras. Patient's mom stated that patient was \"close to a seizure\" end of 2020. It was described as \"felt funny with the strobe lights\" while traveling as a passenger in the car. Episode lasted for few seconds. No associated tongue bite or bladder incontinence. No significant postictal state as per patient's mom. She has been doing well with Trileptal medication. Tolerating medication well without any adverse effects. She has been using walker and has not had any falls. Also has not had any double vision episodes or dizzy spells. No recent hospitalizations. Blood pressure has been stable at home whenever checked. She does have arthritis in both ankles and she has difficulty walking with it. Patient has been using walker. She had occasional falls related to arthritis superimposed on legal blindness. Patient has not had any head injury resulting in headaches or breakthrough seizures.     Mom stated \"Blood pressure checked at work shop and its always 120s/low 80s\". Patient was diagnosed with \"white coat syndrome\". Patient's mom also stated that her PCP, Dr. Jaziel Yusuf has been watching her blood work and has been closely following her daughter and patient's mom is happy with the care stating once a year follow up in neurology clinic is ok. Last seizure like activity: aura or feeling of about to have a seizure in spring of 2017. Of note; her last seizure was in 2009. She has not had any recent hospitalizations. During earlier visits, discussion done about slow AED downward taper. Earlier patient agreed for it. But later patient became very agitated with the concern about possibility of seizure recurrence by decreasing the dose of Trileptal. Mom says patient is very scared of decreasing the dose of trileptal.  Mom requested not to make any changes. Presently she is on Trileptal 150 mg 3 tab in am and 4 tab in pm. Patient has not been having any adverse effects related to Trileptal. She has not had any recurrence of seizures since 2009 and aura of \"feeling of about to have a seizure\" in spring of 2017. She never had dizziness or double vision from it . Review of systems done; pertinent positives include balance problems as stated in HPI. Patient has had a fall early last year but it was attributed to mechanical fall and no neurologic events contributed to it as per patient's mom. All items selected indicate a positive finding. Those items not selected are negative.   Constitutional [] Weight loss/gain   [] Fatigue  [] Fever/Chills   HEENT [] Hearing Loss  [x] Visual Disturbance  [] Tinnitus  [] Eye pain   Respiratory [] Shortness of Breath  [] Cough  [] Snoring   Cardiovascular [] Chest Pain  [] Palpitations  [] Lightheaded   GI [] Constipation  [] Diarrhea  [] Swallowing change    [] Urinary Frequency  [] Urinary Urgency   Musculoskeletal [] Neck pain  [] Back pain  [] Muscle pain  [] Restless legs   Dermatologic [] Skin changes   Neurologic [] Memory loss/confusion  [] Seizures  [x] Trouble walking or imbalance  [] Dizziness  [] Weakness  [] Numbness  [] Tremors  [] Speech Difficulty  [] Headaches  [] Light Sensitivity  [] Sound Sensitivity   Endocrinology []Excessive thirst  []Excessive hunger   Psychiatric [] Anxiety/Depression  [] Hallucination   Allergy/immunology []Hives/environmental allergies   Hematologic/lymph [] Abnormal bleeding  [] Abnormal bruising         Current Outpatient Medications on File Prior to Visit   Medication Sig Dispense Refill    potassium chloride (KLOR-CON M) 10 MEQ extended release tablet Take 0.5 tablets by mouth daily (MAY CUT IN HALF PER PRAMOD-HAS BEEN CUTTING IN HALF FOR YEARS) 45 tablet 2    lactulose (CONSTULOSE) 10 GM/15ML solution Take 30 mLs by mouth 2 times daily as needed (CONSTIPATION) 1800 mL 2    carvedilol (COREG) 25 MG tablet TAKE ONE TABLET BY MOUTH TWICE A  tablet 0    docusate sodium (STOOL SOFTENER) 100 MG capsule Take 1 capsule by mouth 2 times daily 180 capsule 1    lactulose (CHRONULAC) 10 GM/15ML solution Take 1 mL by mouth 2 times daily 180 mL 1    loratadine (CLARITIN) 10 MG tablet Take 1 tablet by mouth daily 90 tablet 1    amLODIPine (NORVASC) 10 MG tablet TAKE ONE TABLET BY MOUTH DAILY 30 tablet 0    montelukast (SINGULAIR) 10 MG tablet TAKE ONE TABLET BY MOUTH ONCE NIGHTLY 90 tablet 0    calcium carbonate (OSCAL) 500 MG TABS tablet Take 1 tablet by mouth 2 times daily (with meals) (Patient not taking: Reported on 5/27/2021) 180 tablet 1    witch hazel (PREPARATION H) 50 % PADS pad Place rectally as needed for Hemorrhoids (Patient not taking: Reported on 5/27/2021) 100 each 1    Multiple Vitamins-Minerals (MULTIVITAMIN WITH MINERALS) tablet Take 1 tablet by mouth daily 90 tablet 1    acetaminophen (AMINOFEN) 325 MG tablet Take 2 tablets by mouth every 6 hours as needed for Pain or Fever 120 tablet 1    Menthol, Arthritis Mother     No Known Problems Father      Appears comfortable and in no distress    NEUROLOGIC EXAMINATION  GENERAL  Appears comfortable and in no distress   HEENT  NC/ AT   NECK  Supple    MENTAL STATUS:  Alert, oriented to self and place, able to recall recent events well. normal speech, normal language, no hallucination or delusion   CRANIAL NERVES: II, III,IV,VI -  EOMs full, no RAULITO, no ptosis  V     -     Unable to perform  VII    -     Normal facial symmetry  VIII   -     Intact hearing  IX,X -     unable to perform  XI    -     Symmetrical shoulder shrug  XII   -     Midline tongue, no atrophy    MOTOR FUNCTION:  significant for no visible weakness in bilateral upper and lower extremities with normal bulk and no involuntary movements, no tremor   SENSORY FUNCTION:  Unable to perform   CEREBELLAR FUNCTION:  Intact fine motor control over upper limbs   REFLEX FUNCTION:  Unable to perform   STATION and GAIT  Normal station, able to walk using walker       Diagnostic data reviewed with the patient and her mom:   EEG (6-17-15): EEG performed during wakefulness did not demonstrate any ongoing  electrographic seizure activity. No epileptiform discharges noted. No  electrographic seizures noted. Trileptal level (8/3/16);  13.6 (1035)      CT Head (8-28-14): No acute intracranial abnormality.       Lipid profile (12/17/2020): Cholesterol 157,  LDL 73, HDL 45, triglycerides 197       CBC:     Lab Results   Component Value Date    WBC 9.3 12/17/2020    HGB 13.3 12/17/2020     12/17/2020      BMP:     Lab Results   Component Value Date     06/02/2021    K 3.7 06/02/2021    GLUCOSE 110 (H) 06/02/2021    CALCIUM 10.1 06/02/2021       No results found for: PHENYTOIN, PHENOBARB, VALPROATE, CBMZ    Lab Results   Component Value Date    CHOL 157 12/17/2020    HDL 45 12/17/2020    TRIG 197 (H) 12/17/2020    ALT 32 (H) 06/02/2021    AST 19 06/02/2021    TSH 1.43 05/13/2020    LABA1C 5.8 06/02/2021 Impression and Plan: Ms. Wayne Pace is a 45 y.o. female with     Complex Partial Epilepsy: Only one breakthrough seizure activity end of 2020; will check Trileptal level today. Blood work from 12/17/2020& 6/2/20 are reviewed; sodium level therapeutic. AST, ALT, CBC were unremarkable as above. All medications sent to [facility] as patient is now in a group home. Caregiver was present during today's visit. Most recent EEG was unremarkable. Patient and her mom do not want any trileptal taper trials. At their request will continue the same. Sodium level done recently is unremarkable. Discussion about Trileptal downward taper: Patient was extremely agitated and patient's mom is requesting not to make any changes in Trileptal dose and they do not want to consider trial of downward taper of Trileptal.      Hypertension: Well controlled and patient's mom has been checking blood pressures sporadically at home as stated above. Counseling done regarding avoidance of seizure precipitating factors such as sleep deprivation and compliance with medications, etc. Complications and sequelae of uncontrolled seizures also discussed. Activity (bathing, swimming, etc)  restrictions also discussed. CVA, unspecified; Bilateral occipital atrophy with legal blindness: on ASA, Atorvastatin 20 mg qhs for stroke prophylaxis. Hyperlipidemia with elevated LDL at 120 (with target LDL < 100): after switching to Atorvastatin; LDL level has improved. Follow up in 6 months. Please note that portions of this note were completed with a voice recognition program.  Although every effort was made to ensure the accuracy of this automated transcription, some errors in transcription may have occurred; occasionally words are mis-transcribed. Thank you for understanding.         Scribe Attestation:   By signing my name below, Antonia Day, attest that this documentation has been prepared under the direction and in the presence of Molly Mcghee MD.        This is a telehealth visit that was performed with the originating site at Patient Location: Patient Home and Provider Location of [place], [place]. Patient ID verified by me prior to start of this visit. Verbal consent to participate in video visit was obtained. Pursuant to the emergency declaration under the [facility] [place] [address] waiver authority and the [employer] Act, this Virtual Visit was conducted, with patient's consent, to reduce the patient's risk of exposure to COVID-19 and provide continuity of care for an established/new patient. Complete and detailed physical examination is not feasible during this virtual video visit and patient is agreeable and understood. Services were provided through a video synchronous discussion virtually to substitute for in-person clinic visit. I discussed with the patient the nature of our telehealth visits via interactive/real-time audio/video that:  - I would evaluate the patient and recommend diagnostics and treatments based on my assessment  - Our sessions are not being recorded and that personal health information is protected  - Our team would provide follow up care in person if/when the patient needs it.

## 2022-03-10 ENCOUNTER — TELEMEDICINE (OUTPATIENT)
Dept: NEUROLOGY | Age: 52
End: 2022-03-10
Payer: MEDICARE

## 2022-03-10 DIAGNOSIS — I67.9 CEREBROVASCULAR DISEASE: ICD-10-CM

## 2022-03-10 DIAGNOSIS — R26.9 GAIT DIFFICULTY: ICD-10-CM

## 2022-03-10 DIAGNOSIS — G40.209 PARTIAL EPILEPSY WITH IMPAIRMENT OF CONSCIOUSNESS (HCC): Primary | ICD-10-CM

## 2022-03-10 DIAGNOSIS — H54.40 BLINDNESS OF LEFT EYE, UNSPECIFIED RIGHT EYE VISUAL IMPAIRMENT CATEGORY: ICD-10-CM

## 2022-03-10 PROCEDURE — G8427 DOCREV CUR MEDS BY ELIG CLIN: HCPCS | Performed by: PSYCHIATRY & NEUROLOGY

## 2022-03-10 PROCEDURE — 3017F COLORECTAL CA SCREEN DOC REV: CPT | Performed by: PSYCHIATRY & NEUROLOGY

## 2022-03-10 PROCEDURE — 99214 OFFICE O/P EST MOD 30 MIN: CPT | Performed by: PSYCHIATRY & NEUROLOGY

## 2022-03-10 RX ORDER — ATORVASTATIN CALCIUM 20 MG/1
20 TABLET, FILM COATED ORAL EVERY EVENING
Qty: 90 TABLET | Refills: 1 | Status: SHIPPED | OUTPATIENT
Start: 2022-03-10 | End: 2022-09-20 | Stop reason: SDUPTHER

## 2022-03-10 RX ORDER — OXCARBAZEPINE 150 MG/1
TABLET, FILM COATED ORAL
Qty: 210 TABLET | Refills: 3 | Status: SHIPPED | OUTPATIENT
Start: 2022-03-10 | End: 2022-08-30 | Stop reason: SDUPTHER

## 2022-03-10 NOTE — PROGRESS NOTES
[address] [facility],4Th Floor Neurology Telehealth Followup Visit    Pt Name: Rudell Duverney  MRN: [id number]  YOB: 1970  Date of evaluation: 3/10/2022    Last visit Date: 8/27/21  Primary Care Physician: HERBIE Abbott CNP  Reason for Evaluation: TELEHEALTH EVALUATION -- Audio/Visual (During QNHER-89 public health emergency)      Dear HERBIE Christianson CNP    I saw Ms. Rudell Duverney in virtual visit follow-up today in continuation of neurologic care. Her mom at bedside. She is a 45 y.o.  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 Her hx is also significant for white coat syndrome. Today she comes to the visit accompanied by her mom and caretaker at bedside. Patient stated that she has been in group home and patient has not had any recurrence of seizure activity. Patient's mom noticed that patient does have sensitivity with feeling of \"about to have a seizure with the strobe lights\". Otherwise she has not had any recurrence of seizure activity since end of 2020. Last seizure was described as \"felt funny with the strobe lights\" while traveling as a passenger in the car. Episode lasted for few seconds. No associated tongue bite or bladder incontinence. No significant postictal state as per patient's mom. She has been doing well with Trileptal medication. Tolerating medication well without any adverse effects. She has been using walker and has not had any falls. Also has not had any double vision episodes or dizzy spells. No recent hospitalizations. Blood pressure has been stable at home whenever checked. She does have arthritis in both ankles and she has difficulty walking with it. Patient has been using walker. She had occasional falls related to arthritis superimposed on legal blindness. Patient has not had any head injury resulting in headaches or breakthrough seizures.     Mom stated \"Blood pressure checked at work shop and its always 120s/low 80s\". Patient was diagnosed with \"white coat syndrome\". Patient's mom also stated that her PCP, Dr. Seng Jacob has been watching her blood work and has been closely following her daughter and patient's mom is happy with the care stating once a year follow up in neurology clinic is ok. Last seizure like activity: aura or feeling of about to have a seizure in spring of 2017. Of note; her last seizure was in 2009. She has not had any recent hospitalizations. During earlier visits, discussion done about slow AED downward taper. Earlier patient agreed for it. But later patient became very agitated with the concern about possibility of seizure recurrence by decreasing the dose of Trileptal. Mom says patient is very scared of decreasing the dose of trileptal.  Mom requested not to make any changes. Presently she is on Trileptal 150 mg 3 tab in am and 4 tab in pm. Patient has not been having any adverse effects related to Trileptal. She has not had any recurrence of seizures since 2009 and aura of \"feeling of about to have a seizure\" in spring of 2017. She never had dizziness or double vision from it . All items selected indicate a positive finding. Those items not selected are negative.   Constitutional [] Weight loss/gain   [] Fatigue  [] Fever/Chills   HEENT [] Hearing Loss  [x] Visual Disturbance  [] Tinnitus  [] Eye pain   Respiratory [] Shortness of Breath  [] Cough  [] Snoring   Cardiovascular [] Chest Pain  [] Palpitations  [] Lightheaded   GI [] Constipation  [] Diarrhea  [] Swallowing change    [] Urinary Frequency  [] Urinary Urgency   Musculoskeletal [] Neck pain  [] Back pain  [] Muscle pain  [] Restless legs   Dermatologic [] Skin changes   Neurologic [] Memory loss/confusion  [] Seizures  [x] Trouble walking or imbalance b/o vision impairment  [] Dizziness  [] Weakness  [] Numbness  [] Tremors  [] Speech Difficulty  [] Headaches  [] Light Sensitivity  [] Sound Sensitivity   Endocrinology []Excessive thirst  []Excessive hunger   Psychiatric [] Anxiety/Depression  [] Hallucination   Allergy/immunology []Hives/environmental allergies   Hematologic/lymph [] Abnormal bleeding  [] Abnormal bruising         Current Outpatient Medications on File Prior to Visit   Medication Sig Dispense Refill    carvedilol (COREG) 25 MG tablet Take 1 tablet by mouth 2 times daily (with meals) 180 tablet 1    docusate sodium (STOOL SOFTENER) 100 MG capsule Take 1 capsule by mouth 2 times daily 180 capsule 1    loratadine (CLARITIN) 10 MG tablet Take 1 tablet by mouth daily 90 tablet 1    Menthol, Topical Analgesic, (BIOFREEZE ROLL-ON) 4 % GEL OTC MEDICATION: APPLY TO BILATERAL KNEES THREE TIMES A DAY AS DIRECTED PRN PAIN 3 each 1    calcium carbonate (OSCAL) 500 MG TABS tablet Take 1 tablet by mouth 2 times daily (with meals) 180 tablet 1    montelukast (SINGULAIR) 10 MG tablet Take 1 tablet by mouth nightly 90 tablet 1    Multiple Vitamins-Minerals (MULTIVITAMIN WITH MINERALS) tablet Take 1 tablet by mouth daily 90 tablet 1    amLODIPine (NORVASC) 10 MG tablet Take 1 tablet by mouth daily 90 tablet 1    furosemide (LASIX) 40 MG tablet Take 1 tablet by mouth daily 90 tablet 1    atorvastatin (LIPITOR) 20 MG tablet Take 1 tablet by mouth every evening 90 tablet 1    OXcarbazepine (TRILEPTAL) 150 MG tablet Take 3 tab in am and 4 tab in pm 210 tablet 3    aspirin 81 MG chewable tablet Take 1 tablet by mouth daily 90 tablet 1    potassium chloride (KLOR-CON M) 10 MEQ extended release tablet Take 0.5 tablets by mouth daily (MAY CUT IN HALF PER PRAMOD-HAS BEEN CUTTING IN HALF FOR YEARS) 45 tablet 2    witch hazel (PREPARATION H) 50 % PADS pad Place rectally as needed for Hemorrhoids (Patient not taking: Reported on 5/27/2021) 100 each 1    acetaminophen (AMINOFEN) 325 MG tablet Take 2 tablets by mouth every 6 hours as needed for Pain or Fever 120 tablet 1    Menthol hallucination or delusion   CRANIAL NERVES: II, III,IV,VI -  EOMs full, no RAULITO, no ptosis  V     -     Unable to perform  VII    -     Normal facial symmetry  VIII   -     Intact hearing  IX,X -     unable to perform  XI    -     Symmetrical shoulder shrug  XII   -     Midline tongue, no atrophy    MOTOR FUNCTION:  significant for no visible weakness in bilateral upper and lower extremities with normal bulk and no involuntary movements, no tremor   SENSORY FUNCTION:  Unable to perform   CEREBELLAR FUNCTION:  Intact fine motor control over upper limbs   REFLEX FUNCTION:  Unable to perform   STATION and GAIT  Normal station, able to walk using walker       Diagnostic data reviewed with the patient and her mom:   EEG (6-17-15): EEG performed during wakefulness did not demonstrate any ongoing  electrographic seizure activity. No epileptiform discharges noted. No  electrographic seizures noted. Trileptal level (8/3/16);  13.6 (10-35)      CT Head (8-28-14): No acute intracranial abnormality. Lipid profile (12/17/2020): Cholesterol 157,  LDL 73, HDL 45, triglycerides 197       CBC:     Lab Results   Component Value Date    WBC 9.3 12/27/2021    HGB 14.1 12/27/2021     12/27/2021      BMP:     Lab Results   Component Value Date     12/27/2021    K 3.8 12/27/2021    GLUCOSE 105 (H) 12/27/2021    CALCIUM 9.7 12/27/2021       No results found for: PHENYTOIN, PHENOBARB, VALPROATE, CBMZ    Lab Results   Component Value Date    CHOL 164 12/27/2021    HDL 42 12/27/2021    TRIG 149 12/27/2021    ALT 23 12/27/2021    AST 18 12/27/2021    TSH 1.43 05/13/2020    LABA1C 5.9 12/27/2021                 Impression and Plan: Ms. Franchesca Mcnamara is a 45 y.o. female with  · Complex partial epilepsy: On Trileptal  · CVA, unspecified; bilateral occipital atrophy with a legal blindness: On ASA, atorvastatin  · Hyperlipidemia: On atorvastatin       Complex Partial Epilepsy:  Only one breakthrough seizure activity end of 2020; unremarkable EEG; sodium level, AST, ALT, CBC were unremarkable; does not want to stop Trileptal; at patient and her mom's request; will continue Trileptal 300+400/d  All meds sent to [facility] as patient is now in a group home. Caregiver was present during today's visit. Discussion about Trileptal downward taper: Patient was extremely agitated and patient's mom is requesting not to make any changes in Trileptal dose and they do not want to consider trial of downward taper of Trileptal.      Hypertension: Well controlled and patient's mom and her caregiver at bedside stated; vitals had been stable. Counseling done regarding avoidance of seizure precipitating factors such as sleep deprivation and compliance with medications, etc. Complications and sequelae of uncontrolled seizures also discussed. Activity (bathing, swimming, etc)  restrictions also discussed. CVA, unspecified; Bilateral occipital atrophy with legal blindness: on ASA, Atorvastatin 20 mg qhs for stroke prophylaxis. Hyperlipidemia with elevated LDL at 120 (with target LDL < 100): after switching to Atorvastatin; LDL level has improved. Follow up in 6 months. Please note that portions of this note were completed with a voice recognition program.  Although every effort was made to ensure the accuracy of this automated transcription, some errors in transcription may have occurred; occasionally words are mis-transcribed. Thank you for understanding. This is a telehealth visit that was performed with the originating site at Patient Location: Patient Home and Provider Location of [place], [place]. Patient ID verified by me prior to start of this visit. Verbal consent to participate in video visit was obtained.  Pursuant to the emergency declaration under the [facility], [address] [place] [address] [facility] authority and the [employer] General Act, this Virtual Visit was conducted, with patient's consent, to reduce the patient's risk of exposure to COVID-19 and provide continuity of care for an established/new patient. Complete and detailed physical examination is not feasible during this virtual video visit and patient is agreeable and understood. Services were provided through a video synchronous discussion virtually to substitute for in-person clinic visit. I discussed with the patient the nature of our telehealth visits via interactive/real-time audio/video that:  - I would evaluate the patient and recommend diagnostics and treatments based on my assessment  - Our sessions are not being recorded and that personal health information is protected  - Our team would provide follow up care in person if/when the patient needs it.

## 2022-08-30 DIAGNOSIS — G40.209 PARTIAL EPILEPSY WITH IMPAIRMENT OF CONSCIOUSNESS (HCC): ICD-10-CM

## 2022-08-30 DIAGNOSIS — I67.9 CEREBROVASCULAR DISEASE: ICD-10-CM

## 2022-08-30 RX ORDER — ASPIRIN 81 MG/1
81 TABLET, CHEWABLE ORAL DAILY
Qty: 90 TABLET | Refills: 3 | Status: SHIPPED | OUTPATIENT
Start: 2022-08-30

## 2022-08-30 RX ORDER — OXCARBAZEPINE 150 MG/1
TABLET, FILM COATED ORAL
Qty: 210 TABLET | Refills: 5 | Status: SHIPPED | OUTPATIENT
Start: 2022-08-30 | End: 2022-12-30

## 2022-08-30 NOTE — TELEPHONE ENCOUNTER
Pharmacy requesting refill of Oxcarbazepine 150mg  & Aspirin 81mg. .      Medication active on med list yes      Date of last fill: 03/10/2022 & 08/27/2021  verified on 8/30/2022     verified by [address] [place], [place] [address]way LPN      Date of last appointment 03/10/2022    Next Visit Date:  9/13/2022

## 2022-09-13 ENCOUNTER — OFFICE VISIT (OUTPATIENT)
Dept: NEUROLOGY | Age: 52
End: 2022-09-13
Payer: MEDICARE

## 2022-09-13 VITALS
WEIGHT: 179 LBS | HEIGHT: 61 IN | DIASTOLIC BLOOD PRESSURE: 74 MMHG | HEART RATE: 55 BPM | SYSTOLIC BLOOD PRESSURE: 130 MMHG | BODY MASS INDEX: 33.79 KG/M2

## 2022-09-13 DIAGNOSIS — G40.209 PARTIAL EPILEPSY WITH IMPAIRMENT OF CONSCIOUSNESS (HCC): Primary | ICD-10-CM

## 2022-09-13 PROCEDURE — 1036F TOBACCO NON-USER: CPT | Performed by: PSYCHIATRY & NEUROLOGY

## 2022-09-13 PROCEDURE — G8417 CALC BMI ABV UP PARAM F/U: HCPCS | Performed by: PSYCHIATRY & NEUROLOGY

## 2022-09-13 PROCEDURE — 99214 OFFICE O/P EST MOD 30 MIN: CPT | Performed by: PSYCHIATRY & NEUROLOGY

## 2022-09-13 PROCEDURE — 3017F COLORECTAL CA SCREEN DOC REV: CPT | Performed by: PSYCHIATRY & NEUROLOGY

## 2022-09-13 PROCEDURE — G8427 DOCREV CUR MEDS BY ELIG CLIN: HCPCS | Performed by: PSYCHIATRY & NEUROLOGY

## 2022-09-13 RX ORDER — DIAPER,BRIEF,INFANT-TODD,DISP
EACH MISCELLANEOUS DAILY PRN
COMMUNITY
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
## 2022-09-16 ENCOUNTER — HOSPITAL ENCOUNTER (OUTPATIENT)
Age: 52
Discharge: HOME | End: 2022-09-16
Payer: MEDICARE

## 2022-09-16 DIAGNOSIS — I10 ESSENTIAL HYPERTENSION: ICD-10-CM

## 2022-09-16 DIAGNOSIS — R79.9 ABNORMAL FINDING OF BLOOD CHEMISTRY, UNSPECIFIED: ICD-10-CM

## 2022-09-16 DIAGNOSIS — G40.209 PARTIAL EPILEPSY WITH IMPAIRMENT OF CONSCIOUSNESS (HCC): ICD-10-CM

## 2022-09-16 LAB
ALBUMIN SERPL-MCNC: 4.6 G/DL (ref 3.5–5.2)
ALBUMIN/GLOBULIN RATIO: 1.6 (ref 1–2.5)
ALP BLD-CCNC: 113 U/L (ref 35–104)
ALT SERPL-CCNC: 23 U/L (ref 5–33)
ANION GAP SERPL CALCULATED.3IONS-SCNC: 11 MMOL/L (ref 9–17)
AST SERPL-CCNC: 17 U/L
BILIRUB SERPL-MCNC: 0.2 MG/DL (ref 0.3–1.2)
BUN BLDV-MCNC: 10 MG/DL (ref 6–20)
BUN/CREAT BLD: 23 (ref 9–20)
CALCIUM SERPL-MCNC: 9.7 MG/DL (ref 8.6–10.4)
CHLORIDE BLD-SCNC: 93 MMOL/L (ref 98–107)
CO2: 28 MMOL/L (ref 20–31)
CREAT SERPL-MCNC: 0.43 MG/DL (ref 0.5–0.9)
GFR AFRICAN AMERICAN: >60 ML/MIN
GFR NON-AFRICAN AMERICAN: >60 ML/MIN
GFR SERPL CREATININE-BSD FRML MDRD: ABNORMAL ML/MIN/{1.73_M2}
GFR SERPL CREATININE-BSD FRML MDRD: ABNORMAL ML/MIN/{1.73_M2}
GLUCOSE BLD-MCNC: 106 MG/DL (ref 70–99)
POTASSIUM SERPL-SCNC: 3.3 MMOL/L (ref 3.7–5.3)
SODIUM BLD-SCNC: 132 MMOL/L (ref 135–144)
TOTAL PROTEIN: 7.5 G/DL (ref 6.4–8.3)

## 2022-09-16 PROCEDURE — 83036 HEMOGLOBIN GLYCOSYLATED A1C: CPT

## 2022-09-16 PROCEDURE — 36415 COLL VENOUS BLD VENIPUNCTURE: CPT

## 2022-09-16 PROCEDURE — 80053 COMPREHEN METABOLIC PANEL: CPT

## 2022-09-18 LAB
ESTIMATED AVERAGE GLUCOSE: 117 MG/DL
HBA1C MFR BLD: 5.7 % (ref 4–6)

## 2023-02-08 DIAGNOSIS — G40.209 PARTIAL EPILEPSY WITH IMPAIRMENT OF CONSCIOUSNESS (HCC): ICD-10-CM

## 2023-02-08 NOTE — TELEPHONE ENCOUNTER
Pharmacy requesting refill of Oxcarbazepine 150 mg.      Medication active on med list yes      Date of last Rx: 8/30/2022 with 5 refills          verified by FELIPE, SEJAL      Date of last appointment 9/13/22    Next Visit Date:   To return in 1 year (September 2023)

## 2023-02-09 RX ORDER — OXCARBAZEPINE 150 MG/1
TABLET, FILM COATED ORAL
Qty: 210 TABLET | Refills: 4 | Status: SHIPPED | OUTPATIENT
Start: 2023-02-09

## 2023-03-15 ENCOUNTER — HOSPITAL ENCOUNTER (OUTPATIENT)
Age: 53
Discharge: HOME | End: 2023-03-15
Payer: MEDICARE

## 2023-03-15 DIAGNOSIS — G40.209 PARTIAL EPILEPSY WITH IMPAIRMENT OF CONSCIOUSNESS (HCC): ICD-10-CM

## 2023-03-15 DIAGNOSIS — I10 ESSENTIAL HYPERTENSION: ICD-10-CM

## 2023-03-15 DIAGNOSIS — R79.9 ABNORMAL FINDING OF BLOOD CHEMISTRY, UNSPECIFIED: ICD-10-CM

## 2023-03-15 LAB
ALBUMIN SERPL-MCNC: 4.5 G/DL (ref 3.5–5.2)
ALBUMIN/GLOBULIN RATIO: 1.5 (ref 1–2.5)
ALP SERPL-CCNC: 117 U/L (ref 35–104)
ALT SERPL-CCNC: 24 U/L (ref 5–33)
ANION GAP SERPL CALCULATED.3IONS-SCNC: 9 MMOL/L (ref 9–17)
AST SERPL-CCNC: 19 U/L
BILIRUB SERPL-MCNC: 0.3 MG/DL (ref 0.3–1.2)
BUN SERPL-MCNC: 8 MG/DL (ref 6–20)
BUN/CREAT BLD: 17 (ref 9–20)
CALCIUM SERPL-MCNC: 9.7 MG/DL (ref 8.6–10.4)
CHLORIDE SERPL-SCNC: 97 MMOL/L (ref 98–107)
CHOLEST SERPL-MCNC: 170 MG/DL
CHOLESTEROL/HDL RATIO: 3.3
CO2 SERPL-SCNC: 25 MMOL/L (ref 20–31)
CREAT SERPL-MCNC: 0.48 MG/DL (ref 0.5–0.9)
EST. AVERAGE GLUCOSE BLD GHB EST-MCNC: 114 MG/DL
GFR SERPL CREATININE-BSD FRML MDRD: >60 ML/MIN/1.73M2
GLUCOSE SERPL-MCNC: 107 MG/DL (ref 70–99)
HBA1C MFR BLD: 5.6 % (ref 4–6)
HCT VFR BLD AUTO: 38 % (ref 36.3–47.1)
HDLC SERPL-MCNC: 51 MG/DL
HGB BLD-MCNC: 13.3 G/DL (ref 11.9–15.1)
LDLC SERPL CALC-MCNC: 84 MG/DL (ref 0–130)
MCH RBC QN AUTO: 30.3 PG (ref 25.2–33.5)
MCHC RBC AUTO-ENTMCNC: 35 G/DL (ref 28.4–34.8)
MCV RBC AUTO: 86.6 FL (ref 82.6–102.9)
NRBC AUTOMATED: 0 PER 100 WBC
PDW BLD-RTO: 12.9 % (ref 11.8–14.4)
PLATELET # BLD AUTO: 284 K/UL (ref 138–453)
PMV BLD AUTO: 9.7 FL (ref 8.1–13.5)
POTASSIUM SERPL-SCNC: 4 MMOL/L (ref 3.7–5.3)
PROT SERPL-MCNC: 7.6 G/DL (ref 6.4–8.3)
RBC # BLD: 4.39 M/UL (ref 3.95–5.11)
SODIUM SERPL-SCNC: 131 MMOL/L (ref 135–144)
TRIGL SERPL-MCNC: 174 MG/DL
WBC # BLD AUTO: 8.7 K/UL (ref 3.5–11.3)

## 2023-03-15 PROCEDURE — 85027 COMPLETE CBC AUTOMATED: CPT

## 2023-03-15 PROCEDURE — 83036 HEMOGLOBIN GLYCOSYLATED A1C: CPT

## 2023-03-15 PROCEDURE — 80061 LIPID PANEL: CPT

## 2023-03-15 PROCEDURE — 80053 COMPREHEN METABOLIC PANEL: CPT

## 2023-03-15 PROCEDURE — 80183 DRUG SCRN QUANT OXCARBAZEPIN: CPT

## 2023-03-15 PROCEDURE — 36415 COLL VENOUS BLD VENIPUNCTURE: CPT

## 2023-03-18 LAB — OXCARBAZEPINE: 18 UG/ML (ref 3–35)

## 2023-06-25 ENCOUNTER — HOSPITAL ENCOUNTER (EMERGENCY)
Age: 53
Discharge: HOME | End: 2023-06-25
Payer: MEDICARE

## 2023-06-25 ENCOUNTER — APPOINTMENT (OUTPATIENT)
Dept: CT IMAGING | Age: 53
End: 2023-06-25
Payer: MEDICARE

## 2023-06-25 VITALS
SYSTOLIC BLOOD PRESSURE: 152 MMHG | OXYGEN SATURATION: 95 % | TEMPERATURE: 99 F | DIASTOLIC BLOOD PRESSURE: 82 MMHG | RESPIRATION RATE: 18 BRPM | HEART RATE: 77 BPM

## 2023-06-25 DIAGNOSIS — N34.2 INFECTIVE URETHRITIS: ICD-10-CM

## 2023-06-25 DIAGNOSIS — R10.9 ABDOMINAL PAIN, UNSPECIFIED ABDOMINAL LOCATION: Primary | ICD-10-CM

## 2023-06-25 DIAGNOSIS — R19.7 DIARRHEA, UNSPECIFIED TYPE: ICD-10-CM

## 2023-06-25 LAB
ALBUMIN SERPL-MCNC: 4.5 G/DL (ref 3.5–5.2)
ALBUMIN/GLOB SERPL: 1.5 {RATIO} (ref 1–2.5)
ALP SERPL-CCNC: 107 U/L (ref 35–104)
ALT SERPL-CCNC: 45 U/L (ref 5–33)
ANION GAP SERPL CALCULATED.3IONS-SCNC: 12 MMOL/L (ref 9–17)
AST SERPL-CCNC: 43 U/L
BACTERIA URNS QL MICRO: ABNORMAL
BASOPHILS # BLD: 0.05 K/UL (ref 0–0.2)
BASOPHILS NFR BLD: 1 % (ref 0–2)
BILIRUB SERPL-MCNC: 0.2 MG/DL (ref 0.3–1.2)
BILIRUB UR QL STRIP: NEGATIVE
BUN SERPL-MCNC: 11 MG/DL (ref 6–20)
BUN/CREAT SERPL: 23 (ref 9–20)
CALCIUM SERPL-MCNC: 9.5 MG/DL (ref 8.6–10.4)
CHLORIDE SERPL-SCNC: 101 MMOL/L (ref 98–107)
CLARITY UR: ABNORMAL
CO2 SERPL-SCNC: 22 MMOL/L (ref 20–31)
COLOR UR: YELLOW
CREAT SERPL-MCNC: 0.48 MG/DL (ref 0.5–0.9)
CRYSTALS URNS MICRO: ABNORMAL /HPF
EOSINOPHIL # BLD: 0.1 K/UL (ref 0–0.44)
EOSINOPHILS RELATIVE PERCENT: 2 % (ref 1–4)
EPI CELLS #/AREA URNS HPF: ABNORMAL /HPF (ref 0–25)
ERYTHROCYTE [DISTWIDTH] IN BLOOD BY AUTOMATED COUNT: 13.2 % (ref 11.8–14.4)
GFR SERPL CREATININE-BSD FRML MDRD: >60 ML/MIN/1.73M2
GLUCOSE SERPL-MCNC: 108 MG/DL (ref 70–99)
GLUCOSE UR STRIP-MCNC: NEGATIVE MG/DL
HCT VFR BLD AUTO: 40.3 % (ref 36.3–47.1)
HGB BLD-MCNC: 13.6 G/DL (ref 11.9–15.1)
HGB UR QL STRIP.AUTO: ABNORMAL
IMM GRANULOCYTES # BLD AUTO: 0.03 K/UL (ref 0–0.3)
IMM GRANULOCYTES NFR BLD: 0 %
KETONES UR STRIP-MCNC: NEGATIVE MG/DL
LEUKOCYTE ESTERASE UR QL STRIP: ABNORMAL
LIPASE SERPL-CCNC: 24 U/L (ref 13–60)
LYMPHOCYTES # BLD: 22 % (ref 24–43)
LYMPHOCYTES NFR BLD: 1.48 K/UL (ref 1.1–3.7)
MCH RBC QN AUTO: 29.5 PG (ref 25.2–33.5)
MCHC RBC AUTO-ENTMCNC: 33.7 G/DL (ref 28.4–34.8)
MCV RBC AUTO: 87.4 FL (ref 82.6–102.9)
MONOCYTES NFR BLD: 0.88 K/UL (ref 0.1–1.2)
MONOCYTES NFR BLD: 13 % (ref 3–12)
MUCOUS THREADS URNS QL MICRO: ABNORMAL
NEUTROPHILS NFR BLD: 62 % (ref 36–65)
NEUTS SEG NFR BLD: 4.15 K/UL (ref 1.5–8.1)
NITRITE UR QL STRIP: NEGATIVE
NRBC BLD-RTO: 0 PER 100 WBC
PH UR STRIP: 6 [PH] (ref 5–9)
PLATELET # BLD AUTO: 271 K/UL (ref 138–453)
PMV BLD AUTO: 9.5 FL (ref 8.1–13.5)
POTASSIUM SERPL-SCNC: 3.7 MMOL/L (ref 3.7–5.3)
PROT SERPL-MCNC: 7.6 G/DL (ref 6.4–8.3)
PROT UR STRIP-MCNC: NEGATIVE MG/DL
RBC # BLD AUTO: 4.61 M/UL (ref 3.95–5.11)
RBC #/AREA URNS HPF: ABNORMAL /HPF (ref 0–2)
SODIUM SERPL-SCNC: 135 MMOL/L (ref 135–144)
SP GR UR STRIP: 1.02 (ref 1.01–1.02)
UROBILINOGEN UR STRIP-ACNC: NORMAL
WBC #/AREA URNS HPF: ABNORMAL /HPF (ref 0–5)
WBC OTHER # BLD: 6.7 K/UL (ref 3.5–11.3)

## 2023-06-25 PROCEDURE — 96375 TX/PRO/DX INJ NEW DRUG ADDON: CPT

## 2023-06-25 PROCEDURE — 36415 COLL VENOUS BLD VENIPUNCTURE: CPT

## 2023-06-25 PROCEDURE — 6360000002 HC RX W HCPCS: Performed by: PHYSICIAN ASSISTANT

## 2023-06-25 PROCEDURE — 2580000003 HC RX 258: Performed by: PHYSICIAN ASSISTANT

## 2023-06-25 PROCEDURE — 85027 COMPLETE CBC AUTOMATED: CPT

## 2023-06-25 PROCEDURE — 74177 CT ABD & PELVIS W/CONTRAST: CPT

## 2023-06-25 PROCEDURE — 81001 URINALYSIS AUTO W/SCOPE: CPT

## 2023-06-25 PROCEDURE — 87086 URINE CULTURE/COLONY COUNT: CPT

## 2023-06-25 PROCEDURE — 96365 THER/PROPH/DIAG IV INF INIT: CPT

## 2023-06-25 PROCEDURE — 83690 ASSAY OF LIPASE: CPT

## 2023-06-25 PROCEDURE — 80053 COMPREHEN METABOLIC PANEL: CPT

## 2023-06-25 PROCEDURE — 99285 EMERGENCY DEPT VISIT HI MDM: CPT

## 2023-06-25 PROCEDURE — 6360000004 HC RX CONTRAST MEDICATION: Performed by: PHYSICIAN ASSISTANT

## 2023-06-25 RX ORDER — CEPHALEXIN 500 MG/1
500 CAPSULE ORAL 4 TIMES DAILY
Qty: 36 CAPSULE | Refills: 0 | Status: SHIPPED | OUTPATIENT
Start: 2023-06-25 | End: 2023-07-04

## 2023-06-25 RX ORDER — ONDANSETRON 2 MG/ML
4 INJECTION INTRAMUSCULAR; INTRAVENOUS ONCE
Status: COMPLETED | OUTPATIENT
Start: 2023-06-25 | End: 2023-06-25

## 2023-06-25 RX ORDER — 0.9 % SODIUM CHLORIDE 0.9 %
1000 INTRAVENOUS SOLUTION INTRAVENOUS ONCE
Status: COMPLETED | OUTPATIENT
Start: 2023-06-25 | End: 2023-06-25

## 2023-06-25 RX ADMIN — SODIUM CHLORIDE 1000 ML: 9 INJECTION, SOLUTION INTRAVENOUS at 14:17

## 2023-06-25 RX ADMIN — IOPAMIDOL 75 ML: 755 INJECTION, SOLUTION INTRAVENOUS at 13:39

## 2023-06-25 RX ADMIN — CEFTRIAXONE SODIUM 1000 MG: 1 INJECTION, POWDER, FOR SOLUTION INTRAMUSCULAR; INTRAVENOUS at 15:24

## 2023-06-25 RX ADMIN — ONDANSETRON 4 MG: 2 INJECTION INTRAMUSCULAR; INTRAVENOUS at 14:04

## 2023-06-25 ASSESSMENT — PAIN - FUNCTIONAL ASSESSMENT: PAIN_FUNCTIONAL_ASSESSMENT: NONE - DENIES PAIN

## 2023-06-25 ASSESSMENT — ENCOUNTER SYMPTOMS: RESPIRATORY NEGATIVE: 1

## 2023-06-27 LAB
MICROORGANISM SPEC CULT: NORMAL
SPECIMEN DESCRIPTION: NORMAL

## 2023-07-10 DIAGNOSIS — G40.209 PARTIAL EPILEPSY WITH IMPAIRMENT OF CONSCIOUSNESS (HCC): ICD-10-CM

## 2023-07-10 NOTE — TELEPHONE ENCOUNTER
Pharmacy requesting refill of Oxcarbazepine 150 mg.      Medication active on med list yes      Date of last Rx: 02/09/23 with 4 refills          verified by SRSEJAL      Date of last appointment 09/13/22    Next Visit Date:  9/26/2023

## 2023-07-12 RX ORDER — OXCARBAZEPINE 150 MG/1
TABLET, FILM COATED ORAL
Qty: 210 TABLET | Refills: 2 | Status: SHIPPED | OUTPATIENT
Start: 2023-07-12

## 2023-08-08 DIAGNOSIS — I67.9 CEREBROVASCULAR DISEASE: ICD-10-CM

## 2023-08-09 RX ORDER — ASPIRIN 81 MG/1
TABLET, CHEWABLE ORAL
Qty: 90 TABLET | Refills: 1 | Status: SHIPPED | OUTPATIENT
Start: 2023-08-09

## 2023-08-09 NOTE — TELEPHONE ENCOUNTER
Pharmacy requesting refill of Aspirin 81mg.       Medication active on med list yes      Date of last Rx: 8/30/2022 with 3 refills          verified by Paola Ramirez LPN      Date of last appointment 9/13/2022    Next Visit Date:  9/26/2023

## 2023-08-18 DIAGNOSIS — G40.209 PARTIAL EPILEPSY WITH IMPAIRMENT OF CONSCIOUSNESS (HCC): ICD-10-CM

## 2023-08-18 DIAGNOSIS — I67.9 CEREBROVASCULAR DISEASE: ICD-10-CM

## 2023-08-18 RX ORDER — ASPIRIN 81 MG
TABLET,CHEWABLE ORAL
Qty: 31 TABLET | Refills: 11 | OUTPATIENT
Start: 2023-08-18

## 2023-08-18 RX ORDER — OXCARBAZEPINE 150 MG/1
TABLET, FILM COATED ORAL
Qty: 217 TABLET | Refills: 11 | OUTPATIENT
Start: 2023-08-18

## 2023-08-31 DIAGNOSIS — I67.9 CEREBROVASCULAR DISEASE: ICD-10-CM

## 2023-08-31 DIAGNOSIS — G40.209 PARTIAL EPILEPSY WITH IMPAIRMENT OF CONSCIOUSNESS (HCC): ICD-10-CM

## 2023-08-31 NOTE — TELEPHONE ENCOUNTER
New pharmacy requesting refills of pt's ASA 81 mg and Trileptal 150mg 3 in am and 4 in pm. These were just sent into [facility] but they won't transfer. Pt's next appt is scheduled for 9/26/23.

## 2023-09-01 RX ORDER — OXCARBAZEPINE 150 MG/1
TABLET, FILM COATED ORAL
Qty: 217 TABLET | Refills: 2 | Status: SHIPPED | OUTPATIENT
Start: 2023-09-01 | End: 2023-11-29

## 2023-09-01 RX ORDER — ASPIRIN 81 MG
TABLET,CHEWABLE ORAL
Qty: 31 TABLET | Refills: 2 | Status: SHIPPED | OUTPATIENT
Start: 2023-09-01 | End: 2023-11-30

## 2023-09-22 ENCOUNTER — HOSPITAL ENCOUNTER (OUTPATIENT)
Age: 53
Setting detail: SPECIMEN
Discharge: HOME | End: 2023-09-22

## 2023-09-22 DIAGNOSIS — I10 ESSENTIAL HYPERTENSION: ICD-10-CM

## 2023-09-22 LAB
ALBUMIN SERPL-MCNC: 4.7 G/DL (ref 3.5–5.2)
ALBUMIN/GLOB SERPL: 1.5 {RATIO} (ref 1–2.5)
ALP SERPL-CCNC: 115 U/L (ref 35–104)
ALT SERPL-CCNC: 23 U/L (ref 5–33)
ANION GAP SERPL CALCULATED.3IONS-SCNC: 13 MMOL/L (ref 9–17)
AST SERPL-CCNC: 23 U/L
BILIRUB SERPL-MCNC: 0.2 MG/DL (ref 0.3–1.2)
BUN SERPL-MCNC: 12 MG/DL (ref 6–20)
CALCIUM SERPL-MCNC: 9.5 MG/DL (ref 8.6–10.4)
CHLORIDE SERPL-SCNC: 100 MMOL/L (ref 98–107)
CO2 SERPL-SCNC: 27 MMOL/L (ref 20–31)
CREAT SERPL-MCNC: 0.4 MG/DL (ref 0.5–0.9)
GFR SERPL CREATININE-BSD FRML MDRD: >60 ML/MIN/1.73M2
GLUCOSE SERPL-MCNC: 83 MG/DL (ref 70–99)
POTASSIUM SERPL-SCNC: 4.5 MMOL/L (ref 3.7–5.3)
PROT SERPL-MCNC: 7.8 G/DL (ref 6.4–8.3)
SODIUM SERPL-SCNC: 140 MMOL/L (ref 135–144)

## 2024-01-18 ENCOUNTER — TELEPHONE (OUTPATIENT)
Dept: NEUROLOGY | Age: 54
End: 2024-01-18

## 2024-01-18 DIAGNOSIS — I67.9 CEREBROVASCULAR DISEASE: ICD-10-CM

## 2024-01-18 NOTE — TELEPHONE ENCOUNTER
Received a call from mail order pharm. requesting refill on ASA 81 mg. Pt. not seen since Sept. 2022. Pt cancelled appt in Nov. 2023.  Needs an appointment

## 2024-01-18 NOTE — TELEPHONE ENCOUNTER
I tried to call pt but it stated not available and was disconnecting. I tried her legal guardian number [phone].  I was able to speak with Joshua.  He said they will try to get this filled with her PCP as she lives in [place].  I told him if they don't want to fill it to call us and we can schedule her to see Dr. Phillips.

## 2024-04-15 ENCOUNTER — HOSPITAL ENCOUNTER (OUTPATIENT)
Age: 54
Discharge: HOME | End: 2024-04-15
Payer: MEDICARE

## 2024-04-15 DIAGNOSIS — I10 ESSENTIAL HYPERTENSION: ICD-10-CM

## 2024-04-15 DIAGNOSIS — R79.9 ABNORMAL FINDING OF BLOOD CHEMISTRY, UNSPECIFIED: ICD-10-CM

## 2024-04-15 DIAGNOSIS — G40.209 PARTIAL EPILEPSY WITH IMPAIRMENT OF CONSCIOUSNESS (HCC): ICD-10-CM

## 2024-04-15 LAB
ALBUMIN SERPL-MCNC: 4.3 G/DL (ref 3.5–5.2)
ALBUMIN/GLOB SERPL: 1.2 {RATIO} (ref 1–2.5)
ALP SERPL-CCNC: 118 U/L (ref 35–104)
ALT SERPL-CCNC: 25 U/L (ref 5–33)
ANION GAP SERPL CALCULATED.3IONS-SCNC: 11 MMOL/L (ref 9–17)
AST SERPL-CCNC: 17 U/L
BILIRUB SERPL-MCNC: 0.3 MG/DL (ref 0.3–1.2)
BUN SERPL-MCNC: 14 MG/DL (ref 6–20)
BUN/CREAT SERPL: 28 (ref 9–20)
CALCIUM SERPL-MCNC: 9.3 MG/DL (ref 8.6–10.4)
CHLORIDE SERPL-SCNC: 102 MMOL/L (ref 98–107)
CHOLEST SERPL-MCNC: 173 MG/DL (ref 0–199)
CHOLESTEROL/HDL RATIO: 4
CO2 SERPL-SCNC: 26 MMOL/L (ref 20–31)
CREAT SERPL-MCNC: 0.5 MG/DL (ref 0.5–0.9)
ERYTHROCYTE [DISTWIDTH] IN BLOOD BY AUTOMATED COUNT: 13.5 % (ref 11.8–14.4)
EST. AVERAGE GLUCOSE BLD GHB EST-MCNC: 120 MG/DL
GFR SERPL CREATININE-BSD FRML MDRD: >90 ML/MIN/1.73M2
GLUCOSE SERPL-MCNC: 105 MG/DL (ref 70–99)
HBA1C MFR BLD: 5.8 % (ref 4–6)
HCT VFR BLD AUTO: 41.6 % (ref 36.3–47.1)
HDLC SERPL-MCNC: 46 MG/DL
HGB BLD-MCNC: 13.7 G/DL (ref 11.9–15.1)
LDLC SERPL CALC-MCNC: 94 MG/DL (ref 0–100)
MCH RBC QN AUTO: 29.3 PG (ref 25.2–33.5)
MCHC RBC AUTO-ENTMCNC: 32.9 G/DL (ref 28.4–34.8)
MCV RBC AUTO: 88.9 FL (ref 82.6–102.9)
NRBC BLD-RTO: 0 PER 100 WBC
PLATELET # BLD AUTO: 321 K/UL (ref 138–453)
PMV BLD AUTO: 10.2 FL (ref 8.1–13.5)
POTASSIUM SERPL-SCNC: 4.1 MMOL/L (ref 3.7–5.3)
PROT SERPL-MCNC: 7.8 G/DL (ref 6.4–8.3)
RBC # BLD AUTO: 4.68 M/UL (ref 3.95–5.11)
SODIUM SERPL-SCNC: 139 MMOL/L (ref 135–144)
TRIGL SERPL-MCNC: 165 MG/DL
VLDLC SERPL CALC-MCNC: 33 MG/DL
WBC OTHER # BLD: 11.4 K/UL (ref 3.5–11.3)

## 2024-04-15 PROCEDURE — 80053 COMPREHEN METABOLIC PANEL: CPT

## 2024-04-15 PROCEDURE — 80183 DRUG SCRN QUANT OXCARBAZEPIN: CPT

## 2024-04-15 PROCEDURE — 83036 HEMOGLOBIN GLYCOSYLATED A1C: CPT

## 2024-04-15 PROCEDURE — 36415 COLL VENOUS BLD VENIPUNCTURE: CPT

## 2024-04-15 PROCEDURE — 85027 COMPLETE CBC AUTOMATED: CPT

## 2024-04-15 PROCEDURE — 80061 LIPID PANEL: CPT

## 2024-04-17 LAB — 10OH-CARBAZEPINE SERPL-MCNC: 19 UG/ML (ref 3–35)

## 2024-04-24 ENCOUNTER — TELEPHONE (OUTPATIENT)
Dept: GASTROENTEROLOGY | Age: 54
End: 2024-04-24

## 2024-05-06 ENCOUNTER — OFFICE VISIT (OUTPATIENT)
Dept: NEUROLOGY | Age: 54
End: 2024-05-06
Payer: MEDICARE

## 2024-05-06 VITALS
DIASTOLIC BLOOD PRESSURE: 82 MMHG | SYSTOLIC BLOOD PRESSURE: 137 MMHG | BODY MASS INDEX: 38.34 KG/M2 | HEIGHT: 61 IN | HEART RATE: 66 BPM | RESPIRATION RATE: 16 BRPM | WEIGHT: 203.1 LBS | TEMPERATURE: 96.6 F

## 2024-05-06 DIAGNOSIS — G40.909 NONINTRACTABLE EPILEPSY WITHOUT STATUS EPILEPTICUS, UNSPECIFIED EPILEPSY TYPE (HCC): Primary | ICD-10-CM

## 2024-05-06 PROCEDURE — 1036F TOBACCO NON-USER: CPT | Performed by: NEUROMUSCULOSKELETAL MEDICINE, SPORTS MEDICINE

## 2024-05-06 PROCEDURE — 99203 OFFICE O/P NEW LOW 30 MIN: CPT | Performed by: NEUROMUSCULOSKELETAL MEDICINE, SPORTS MEDICINE

## 2024-05-06 PROCEDURE — 3017F COLORECTAL CA SCREEN DOC REV: CPT | Performed by: NEUROMUSCULOSKELETAL MEDICINE, SPORTS MEDICINE

## 2024-05-06 PROCEDURE — G8427 DOCREV CUR MEDS BY ELIG CLIN: HCPCS | Performed by: NEUROMUSCULOSKELETAL MEDICINE, SPORTS MEDICINE

## 2024-05-06 PROCEDURE — G8417 CALC BMI ABV UP PARAM F/U: HCPCS | Performed by: NEUROMUSCULOSKELETAL MEDICINE, SPORTS MEDICINE

## 2024-05-06 PROCEDURE — 3079F DIAST BP 80-89 MM HG: CPT | Performed by: NEUROMUSCULOSKELETAL MEDICINE, SPORTS MEDICINE

## 2024-05-06 PROCEDURE — 3075F SYST BP GE 130 - 139MM HG: CPT | Performed by: NEUROMUSCULOSKELETAL MEDICINE, SPORTS MEDICINE
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
## 2024-05-13 ENCOUNTER — OFFICE VISIT (OUTPATIENT)
Dept: GASTROENTEROLOGY | Age: 54
End: 2024-05-13
Payer: MEDICARE

## 2024-05-13 ENCOUNTER — TELEPHONE (OUTPATIENT)
Dept: GASTROENTEROLOGY | Age: 54
End: 2024-05-13

## 2024-05-13 VITALS
OXYGEN SATURATION: 97 % | BODY MASS INDEX: 38.36 KG/M2 | WEIGHT: 203 LBS | DIASTOLIC BLOOD PRESSURE: 82 MMHG | SYSTOLIC BLOOD PRESSURE: 137 MMHG | HEART RATE: 66 BPM | RESPIRATION RATE: 18 BRPM

## 2024-05-13 DIAGNOSIS — Z01.818 PRE-OP TESTING: ICD-10-CM

## 2024-05-13 DIAGNOSIS — R19.5 POSITIVE FIT (FECAL IMMUNOCHEMICAL TEST): Primary | ICD-10-CM

## 2024-05-13 PROCEDURE — 3075F SYST BP GE 130 - 139MM HG: CPT | Performed by: NURSE PRACTITIONER

## 2024-05-13 PROCEDURE — G8417 CALC BMI ABV UP PARAM F/U: HCPCS | Performed by: NURSE PRACTITIONER

## 2024-05-13 PROCEDURE — G8427 DOCREV CUR MEDS BY ELIG CLIN: HCPCS | Performed by: NURSE PRACTITIONER

## 2024-05-13 PROCEDURE — 3017F COLORECTAL CA SCREEN DOC REV: CPT | Performed by: NURSE PRACTITIONER

## 2024-05-13 PROCEDURE — 1036F TOBACCO NON-USER: CPT | Performed by: NURSE PRACTITIONER

## 2024-05-13 PROCEDURE — 3079F DIAST BP 80-89 MM HG: CPT | Performed by: NURSE PRACTITIONER

## 2024-05-13 PROCEDURE — 99203 OFFICE O/P NEW LOW 30 MIN: CPT | Performed by: NURSE PRACTITIONER

## 2024-05-13 RX ORDER — POLYETHYLENE GLYCOL 3350, SODIUM SULFATE ANHYDROUS, SODIUM BICARBONATE, SODIUM CHLORIDE, POTASSIUM CHLORIDE 236; 22.74; 6.74; 5.86; 2.97 G/4L; G/4L; G/4L; G/4L; G/4L
4 POWDER, FOR SOLUTION ORAL ONCE
Qty: 4000 ML | Refills: 0 | Status: SHIPPED | OUTPATIENT
Start: 2024-05-13 | End: 2024-05-13

## 2024-05-13 ASSESSMENT — ENCOUNTER SYMPTOMS
ALLERGIC/IMMUNOLOGIC NEGATIVE: 1
COUGH: 0
WHEEZING: 0
SHORTNESS OF BREATH: 0
CONSTIPATION: 1
TROUBLE SWALLOWING: 0

## 2024-05-13 NOTE — TELEPHONE ENCOUNTER
Pietro ELA Lisbeth     1970        female    [address] [place] [address]                    Legal Guardian yes  If yes, Name: Yuri Gutierrez     Skilled Facility yes   If yes, Name:  [facility]                                          Home Phone: [phone]         Cell Phone:    Telephone Information:   Mobile [phone]                                           Surgeon: Wade   Surgery Date: 10/31/24                      Time:     Procedure: colonoscopy  Duration:    Diagnosis:   positive Fit R19.5  CPT Codes: 67689    Important Medical History:  In Epic    First Assistant   Special Inst/Equip/Implants: Regular    Nickel allergy  no  Latex Allergy: no      Cardiac Device:  No  If yes, need most recent pacemaker interrogation from Cardiologist:  Type of pacemaker:    Anesthesia:    MAC                       Admission Type:  Same Day                        Admit Prior to Day of Surgery: No    Case Location:  Ambulatory            Preadmission Testing:  Phone Call             PAT Date and Time:     Need Preop Cardiac Clearance:   Need Pre-op/Medical Clearance: no    Does Patient have Cardiologist/physician? Name of Physician:        Special Needs Communication:  Rod Lift no    needed no

## 2024-05-13 NOTE — PROGRESS NOTES
[address] [address]    Chief Complaint   Patient presents with    GI Problem     Pt presents for for inconclusive stools sample that was done last month and blood was found in stool. Pt states she has not noticed any blood in stool before. Pt states normal BM with no issues or concerns. Pt denies abdominal pain.          HPI Pietro Bob is a 54 y.o. old female who has a past medical history of asthma, HTN, blindness, mental disability, RA, seizures presents as a new GI referral with a positive fit test 4/15/2024.  Pietro is accompanied by her care giver, Irasema.  According to patient and caregiver, Angela and has been feeling well, doing well with no complaints.  She denies rectal bleeding, melena, unintentional weight loss, fever or other systemic symptoms.  She endorses that she will experience occasional acid reflux that she uses Tums for.  She is not feel this is a concern at this time.    Family history of upper GI cancer:  No  Family history of colon cancer: No  Blood in stool: No  Unintentional weight loss: No  Abdominal pain: No  Prior colonoscopy: No  Prior EGD: No  Constipation history: Yes  Number of bowel movements a day: 2  Change in stool caliber: No  History of GERD or Acid Reflux: Yes and uses TUMS    Lab Results   Component Value Date    LVEF 60 06/26/2020        Past Surgical History:   Procedure Laterality Date    ABDOMINAL ADHESION SURGERY  8/2001    CHOLECYSTECTOMY  3/2001    CYST REMOVAL Right 10/31/2018    upper back    FOOT SURGERY  1997 & 1998    right (97)  & left (98)    HYSTERECTOMY (CERVIX STATUS UNKNOWN)  2012    benign tumor    JOINT REPLACEMENT  05/22/2017    left knee replacement    KNEE ARTHROSCOPY  2011    left    [place] ACNE SURGERY Right 10/31/2018    SHOULDER LESION BIOPSY EXCISION- INFECTED SEBACEOUS  CYST ON BACK performed by Dulce Schilling MD at [facility] OR    ROTATOR CUFF REPAIR  1999    left    TONSILLECTOMY  1978         Current Outpatient Medications

## 2024-05-13 NOTE — TELEPHONE ENCOUNTER
Pietro Bob     1970        female    [address] [place] [address]                                Home Phone: [phone]         Cell Phone:    Telephone Information:   Mobile [phone]                                           Surgeon: Wade   Surgery Date: 10/31/24                      Time:     Procedure: colonoscopy  Duration:    Diagnosis:  positive fit R19.5  CPT Codes: 48502    Important Medical History:  In Epic    First Assistant   Special Inst/Equip/Implants: Regular    Nickel allergy  no  Latex Allergy: no      Cardiac Device:  No  If yes, need most recent pacemaker interrogation from Cardiologist:  Type of pacemaker:    Anesthesia:    MAC                       Admission Type:  Same Day                          Admit Prior to Day of Surgery: No    Case Location:  Ambulatory            Preadmission Testing:  Phone Call             PAT Date and Time:     Need Preop Cardiac Clearance:   Need Pre-op/Medical Clearance: no    Does Patient have Cardiologist/physician? Name of Physician:        Special Needs Communication:  Rod Lift no    needed no

## 2024-07-05 ENCOUNTER — APPOINTMENT (OUTPATIENT)
Dept: GENERAL RADIOLOGY | Age: 54
End: 2024-07-05
Payer: MEDICARE

## 2024-07-05 ENCOUNTER — HOSPITAL ENCOUNTER (EMERGENCY)
Age: 54
Discharge: HOME | End: 2024-07-05
Attending: EMERGENCY MEDICINE
Payer: MEDICARE

## 2024-07-05 VITALS
SYSTOLIC BLOOD PRESSURE: 138 MMHG | OXYGEN SATURATION: 97 % | DIASTOLIC BLOOD PRESSURE: 65 MMHG | TEMPERATURE: 98.4 F | RESPIRATION RATE: 18 BRPM | HEART RATE: 78 BPM

## 2024-07-05 DIAGNOSIS — M25.512 ACUTE PAIN OF LEFT SHOULDER: ICD-10-CM

## 2024-07-05 DIAGNOSIS — M65.20 CALCIFIC TENDONITIS: Primary | ICD-10-CM

## 2024-07-05 LAB
ANION GAP SERPL CALCULATED.3IONS-SCNC: 11 MMOL/L (ref 9–17)
BASOPHILS # BLD: 0.05 K/UL (ref 0–0.2)
BASOPHILS NFR BLD: 0 % (ref 0–2)
BUN SERPL-MCNC: 12 MG/DL (ref 6–20)
BUN/CREAT SERPL: 24 (ref 9–20)
CALCIUM SERPL-MCNC: 9.2 MG/DL (ref 8.6–10.4)
CHLORIDE SERPL-SCNC: 100 MMOL/L (ref 98–107)
CO2 SERPL-SCNC: 27 MMOL/L (ref 20–31)
CREAT SERPL-MCNC: 0.5 MG/DL (ref 0.5–0.9)
EOSINOPHIL # BLD: 0.32 K/UL (ref 0–0.44)
EOSINOPHILS RELATIVE PERCENT: 2 % (ref 1–4)
ERYTHROCYTE [DISTWIDTH] IN BLOOD BY AUTOMATED COUNT: 13.7 % (ref 11.8–14.4)
ERYTHROCYTE [SEDIMENTATION RATE] IN BLOOD BY PHOTOMETRIC METHOD: 51 MM/HR (ref 0–30)
GFR, ESTIMATED: >90 ML/MIN/1.73M2
GLUCOSE SERPL-MCNC: 103 MG/DL (ref 70–99)
HCT VFR BLD AUTO: 40.4 % (ref 36.3–47.1)
HGB BLD-MCNC: 13.4 G/DL (ref 11.9–15.1)
IMM GRANULOCYTES # BLD AUTO: 0.06 K/UL (ref 0–0.3)
IMM GRANULOCYTES NFR BLD: 0 %
LYMPHOCYTES NFR BLD: 3.58 K/UL (ref 1.1–3.7)
LYMPHOCYTES RELATIVE PERCENT: 25 % (ref 24–43)
MCH RBC QN AUTO: 29.2 PG (ref 25.2–33.5)
MCHC RBC AUTO-ENTMCNC: 33.2 G/DL (ref 28.4–34.8)
MCV RBC AUTO: 88 FL (ref 82.6–102.9)
MONOCYTES NFR BLD: 1.48 K/UL (ref 0.1–1.2)
MONOCYTES NFR BLD: 10 % (ref 3–12)
NEUTROPHILS NFR BLD: 63 % (ref 36–65)
NEUTS SEG NFR BLD: 8.88 K/UL (ref 1.5–8.1)
NRBC BLD-RTO: 0 PER 100 WBC
PLATELET # BLD AUTO: 291 K/UL (ref 138–453)
PMV BLD AUTO: 10.4 FL (ref 8.1–13.5)
POTASSIUM SERPL-SCNC: 3.6 MMOL/L (ref 3.7–5.3)
RBC # BLD AUTO: 4.59 M/UL (ref 3.95–5.11)
SODIUM SERPL-SCNC: 138 MMOL/L (ref 135–144)
WBC OTHER # BLD: 14.4 K/UL (ref 3.5–11.3)

## 2024-07-05 PROCEDURE — 6370000000 HC RX 637 (ALT 250 FOR IP): Performed by: EMERGENCY MEDICINE

## 2024-07-05 PROCEDURE — 80048 BASIC METABOLIC PNL TOTAL CA: CPT

## 2024-07-05 PROCEDURE — 73030 X-RAY EXAM OF SHOULDER: CPT

## 2024-07-05 PROCEDURE — 85652 RBC SED RATE AUTOMATED: CPT

## 2024-07-05 PROCEDURE — 99284 EMERGENCY DEPT VISIT MOD MDM: CPT

## 2024-07-05 PROCEDURE — 85025 COMPLETE CBC W/AUTO DIFF WBC: CPT

## 2024-07-05 RX ORDER — NAPROXEN 250 MG/1
500 TABLET ORAL ONCE
Status: COMPLETED | OUTPATIENT
Start: 2024-07-05 | End: 2024-07-05

## 2024-07-05 RX ORDER — IBUPROFEN 600 MG/1
600 TABLET ORAL EVERY 6 HOURS PRN
Qty: 30 TABLET | Refills: 0 | Status: SHIPPED | OUTPATIENT
Start: 2024-07-05

## 2024-07-05 RX ADMIN — NAPROXEN 500 MG: 250 TABLET ORAL at 17:42

## 2024-07-05 ASSESSMENT — PAIN DESCRIPTION - LOCATION
LOCATION: SHOULDER
LOCATION: SHOULDER

## 2024-07-05 ASSESSMENT — PAIN SCALES - GENERAL
PAINLEVEL_OUTOF10: 10
PAINLEVEL_OUTOF10: 10

## 2024-07-05 ASSESSMENT — PAIN DESCRIPTION - ORIENTATION
ORIENTATION: LEFT
ORIENTATION: LEFT

## 2024-07-05 ASSESSMENT — PAIN DESCRIPTION - DESCRIPTORS: DESCRIPTORS: SORE

## 2024-07-05 NOTE — DISCHARGE INSTRUCTIONS
Use sling and swath for the next few days.  Follow-up with Dr. Garcia next week for evaluation of your calcific tendinitis.  Return if any problems with fevers or other difficulties.
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
## 2024-09-20 ENCOUNTER — HOSPITAL ENCOUNTER (OUTPATIENT)
Age: 54
Discharge: HOME | End: 2024-09-20
Payer: MEDICARE

## 2024-09-20 DIAGNOSIS — I10 ESSENTIAL HYPERTENSION: ICD-10-CM

## 2024-09-20 LAB
ALBUMIN SERPL-MCNC: 4.7 G/DL (ref 3.5–5.2)
ALBUMIN/GLOB SERPL: 1.5 {RATIO} (ref 1–2.5)
ALP SERPL-CCNC: 131 U/L (ref 35–104)
ALT SERPL-CCNC: 26 U/L (ref 10–35)
ANION GAP SERPL CALCULATED.3IONS-SCNC: 11 MMOL/L (ref 9–16)
AST SERPL-CCNC: 23 U/L (ref 10–35)
BILIRUB SERPL-MCNC: <0.2 MG/DL (ref 0–1.2)
BUN SERPL-MCNC: 7 MG/DL (ref 6–20)
BUN/CREAT SERPL: 14 (ref 9–20)
CALCIUM SERPL-MCNC: 10 MG/DL (ref 8.6–10.4)
CHLORIDE SERPL-SCNC: 102 MMOL/L (ref 98–107)
CO2 SERPL-SCNC: 28 MMOL/L (ref 20–31)
CREAT SERPL-MCNC: 0.5 MG/DL (ref 0.5–0.9)
GFR, ESTIMATED: >90 ML/MIN/1.73M2
GLUCOSE SERPL-MCNC: 106 MG/DL (ref 74–99)
POTASSIUM SERPL-SCNC: 3.7 MMOL/L (ref 3.7–5.3)
PROT SERPL-MCNC: 7.8 G/DL (ref 6.6–8.7)
SODIUM SERPL-SCNC: 141 MMOL/L (ref 136–145)

## 2024-09-20 PROCEDURE — 80053 COMPREHEN METABOLIC PANEL: CPT

## 2024-09-20 PROCEDURE — 36415 COLL VENOUS BLD VENIPUNCTURE: CPT

## 2024-11-04 DIAGNOSIS — G40.209 PARTIAL EPILEPSY WITH IMPAIRMENT OF CONSCIOUSNESS (HCC): ICD-10-CM

## 2024-11-04 RX ORDER — OXCARBAZEPINE 150 MG/1
TABLET, FILM COATED ORAL
Qty: 217 TABLET | Refills: 2 | Status: SHIPPED | OUTPATIENT
Start: 2024-11-04 | End: 2025-08-01

## 2024-12-02 ENCOUNTER — HOSPITAL ENCOUNTER (OUTPATIENT)
Age: 54
Discharge: HOME | End: 2024-12-02

## 2025-01-15 NOTE — PROGRESS NOTES
I spoke to Kael, one of patient's guardians. He instructed me to call [facility] to complete the PAT call.

## 2025-01-16 NOTE — PROGRESS NOTES
Patient's care giver Marilynn, states they received their colon prep instructions and home medications that are to be taken on the day of their procedure with a small sip of water only, from the physician's office.  Patient's care giver Marilynn will hold patient's aspirin, vitamins and supplements starting today. Lasix will be held the morning of her procedure. Per caregiver Marilynn, patient is unable to swallow her medications with a small sip of water. So she will hold her a.m. medications, unless she can co-ordinate with prep instructions, the morning of her procedure.

## 2025-01-16 NOTE — PROGRESS NOTES
Attempted PAT phone call; Staff  did not identify themselves, but informed me an Becca would be returning my call today.

## 2025-01-20 ENCOUNTER — ANESTHESIA EVENT (OUTPATIENT)
Dept: OPERATING ROOM | Age: 55
End: 2025-01-20
Payer: MEDICARE

## 2025-01-21 ENCOUNTER — TELEPHONE (OUTPATIENT)
Dept: SURGERY | Age: 55
End: 2025-01-21

## 2025-01-21 NOTE — TELEPHONE ENCOUNTER
Spoke to guardian Joshua Curry who states they will be present at procedure on 1/22/25 to sign consent.

## 2025-01-21 NOTE — TELEPHONE ENCOUNTER
Per facility Request for go-lytely for colon prep to be sent for scheduled colonoscopy on 1/22/25.

## 2025-01-22 ENCOUNTER — ANESTHESIA (OUTPATIENT)
Dept: OPERATING ROOM | Age: 55
End: 2025-01-22
Payer: MEDICARE

## 2025-01-22 ENCOUNTER — HOSPITAL ENCOUNTER (OUTPATIENT)
Age: 55
Setting detail: OUTPATIENT SURGERY
Discharge: HOME | End: 2025-01-22
Attending: STUDENT IN AN ORGANIZED HEALTH CARE EDUCATION/TRAINING PROGRAM | Admitting: STUDENT IN AN ORGANIZED HEALTH CARE EDUCATION/TRAINING PROGRAM
Payer: MEDICARE

## 2025-01-22 VITALS
WEIGHT: 200 LBS | SYSTOLIC BLOOD PRESSURE: 166 MMHG | RESPIRATION RATE: 16 BRPM | HEART RATE: 62 BPM | HEIGHT: 59 IN | DIASTOLIC BLOOD PRESSURE: 106 MMHG | BODY MASS INDEX: 40.32 KG/M2 | OXYGEN SATURATION: 97 % | TEMPERATURE: 97 F

## 2025-01-22 DIAGNOSIS — R19.5 POSITIVE FIT (FECAL IMMUNOCHEMICAL TEST): ICD-10-CM

## 2025-01-22 PROCEDURE — 3700000001 HC ADD 15 MINUTES (ANESTHESIA): Performed by: STUDENT IN AN ORGANIZED HEALTH CARE EDUCATION/TRAINING PROGRAM

## 2025-01-22 PROCEDURE — 7100000011 HC PHASE II RECOVERY - ADDTL 15 MIN: Performed by: STUDENT IN AN ORGANIZED HEALTH CARE EDUCATION/TRAINING PROGRAM

## 2025-01-22 PROCEDURE — 88305 TISSUE EXAM BY PATHOLOGIST: CPT

## 2025-01-22 PROCEDURE — 3609010600 HC COLONOSCOPY POLYPECTOMY SNARE/COLD BIOPSY: Performed by: STUDENT IN AN ORGANIZED HEALTH CARE EDUCATION/TRAINING PROGRAM

## 2025-01-22 PROCEDURE — 2709999900 HC NON-CHARGEABLE SUPPLY: Performed by: STUDENT IN AN ORGANIZED HEALTH CARE EDUCATION/TRAINING PROGRAM

## 2025-01-22 PROCEDURE — 7100000010 HC PHASE II RECOVERY - FIRST 15 MIN: Performed by: STUDENT IN AN ORGANIZED HEALTH CARE EDUCATION/TRAINING PROGRAM

## 2025-01-22 PROCEDURE — 6360000002 HC RX W HCPCS

## 2025-01-22 PROCEDURE — 3700000000 HC ANESTHESIA ATTENDED CARE: Performed by: STUDENT IN AN ORGANIZED HEALTH CARE EDUCATION/TRAINING PROGRAM

## 2025-01-22 PROCEDURE — 45385 COLONOSCOPY W/LESION REMOVAL: CPT | Performed by: STUDENT IN AN ORGANIZED HEALTH CARE EDUCATION/TRAINING PROGRAM

## 2025-01-22 PROCEDURE — 45380 COLONOSCOPY AND BIOPSY: CPT | Performed by: STUDENT IN AN ORGANIZED HEALTH CARE EDUCATION/TRAINING PROGRAM

## 2025-01-22 RX ORDER — LIDOCAINE HYDROCHLORIDE 20 MG/ML
INJECTION, SOLUTION EPIDURAL; INFILTRATION; INTRACAUDAL; PERINEURAL
Status: DISCONTINUED | OUTPATIENT
Start: 2025-01-22 | End: 2025-01-22 | Stop reason: SDUPTHER

## 2025-01-22 RX ORDER — SODIUM CHLORIDE 0.9 % (FLUSH) 0.9 %
5-40 SYRINGE (ML) INJECTION PRN
Status: CANCELLED | OUTPATIENT
Start: 2025-01-22

## 2025-01-22 RX ORDER — SODIUM CHLORIDE 9 MG/ML
INJECTION, SOLUTION INTRAVENOUS PRN
Status: CANCELLED | OUTPATIENT
Start: 2025-01-22

## 2025-01-22 RX ORDER — NALOXONE HYDROCHLORIDE 0.4 MG/ML
INJECTION, SOLUTION INTRAMUSCULAR; INTRAVENOUS; SUBCUTANEOUS PRN
Status: CANCELLED | OUTPATIENT
Start: 2025-01-22

## 2025-01-22 RX ORDER — ONDANSETRON 2 MG/ML
4 INJECTION INTRAMUSCULAR; INTRAVENOUS
Status: CANCELLED | OUTPATIENT
Start: 2025-01-22 | End: 2025-01-23

## 2025-01-22 RX ORDER — SODIUM CHLORIDE 0.9 % (FLUSH) 0.9 %
5-40 SYRINGE (ML) INJECTION EVERY 12 HOURS SCHEDULED
Status: CANCELLED | OUTPATIENT
Start: 2025-01-22

## 2025-01-22 RX ORDER — PROPOFOL 10 MG/ML
INJECTION, EMULSION INTRAVENOUS
Status: DISCONTINUED | OUTPATIENT
Start: 2025-01-22 | End: 2025-01-22 | Stop reason: SDUPTHER

## 2025-01-22 RX ADMIN — PROPOFOL 180 MCG/KG/MIN: 10 INJECTION, EMULSION INTRAVENOUS at 12:54

## 2025-01-22 RX ADMIN — LIDOCAINE HYDROCHLORIDE 80 MG: 20 INJECTION, SOLUTION EPIDURAL; INFILTRATION; INTRACAUDAL; PERINEURAL at 12:54

## 2025-01-22 RX ADMIN — PROPOFOL 70 MG: 10 INJECTION, EMULSION INTRAVENOUS at 12:55

## 2025-01-22 ASSESSMENT — PAIN SCALES - GENERAL: PAINLEVEL_OUTOF10: 0

## 2025-01-22 ASSESSMENT — PAIN - FUNCTIONAL ASSESSMENT: PAIN_FUNCTIONAL_ASSESSMENT: FACE, LEGS, ACTIVITY, CRY, AND CONSOLABILITY (FLACC)

## 2025-01-22 NOTE — ANESTHESIA POSTPROCEDURE EVALUATION
Department of Anesthesiology  Postprocedure Note    Patient: Pietro Bob  MRN: [id number]  YOB: 1970  Date of evaluation: 1/22/2025    Procedure Summary       Date: 01/22/25 Room / Location: [address]    Anesthesia Start: 1248 Anesthesia Stop: 1329    Procedure: COLONOSCOPY POLYPECTOMY SNARE/BIOPSY (Abdomen) Diagnosis:       Positive FIT (fecal immunochemical test)      (Positive FIT (fecal immunochemical test) [R19.5])    Surgeons: Bowen Wing DO Responsible Provider: Michaela Kauffman APRN - CRNA    Anesthesia Type: General, TIVA ASA Status: 3            Anesthesia Type: General, TIVA    Mariola Phase I: Mariola Score: 10    Mariola Phase II: Mariola Score: 10    Anesthesia Post Evaluation    Patient location during evaluation: PACU  Patient participation: complete - patient participated  Level of consciousness: awake and alert  Pain score: 0  Airway patency: patent  Nausea & Vomiting: no nausea and no vomiting  Cardiovascular status: blood pressure returned to baseline and hemodynamically stable  Respiratory status: acceptable  Hydration status: euvolemic  Multimodal analgesia pain management approach  Pain management: adequate        No notable events documented.

## 2025-01-22 NOTE — ANESTHESIA PRE PROCEDURE
Department of Anesthesiology  Preprocedure Note       Name:  Pietro Bob   Age:  54 y.o.  :  1970                                          MRN:  [id number]         Date:  2025      Surgeon: Surgeon(s):  Bowen Wing DO    Procedure: COLORECTAL CANCER SCREENING, NOT HIGH RISK    Medications prior to admission:   Prior to Admission medications    Medication Sig Start Date End Date Taking? Authorizing Provider   OXcarbazepine (TRILEPTAL) 150 MG tablet TAKE 3 TABLETS (450MG) BY MOUTH EVERY MORNING;TAKE 4 TABLETS (600MG) BY MOUTH EVERY EVENING 24 Yes Rad Ibarra MD   lactulose (CONSTULOSE) 10 GM/15ML solution TAKE 7.5ML BY MOUTH DAILY AT 8PM 24  Yes John Willis APRN - CNP   loratadine (CLARITIN) 10 MG tablet TAKE 1 TABLET BY MOUTH ONCE EVERY DAY 24  Yes John Willis APRN - CNP   Multiple Vitamins-Minerals (MULTIVITAMIN WITH MINERALS) tablet TAKE 1 TABLET BY MOUTH ONCE EVERY DAY 24  Yes John Willis APRN - CNP   calcium elemental (OSCAL) 500 MG TABS tablet TAKE 1 TABLET BY MOUTH TWICE DAILY WITH MEALS 24  Yes John Willis APRN - CNP   potassium chloride (KLOR-CON) 10 MEQ extended release tablet TAKE 1 TABLET BY MOUTH ONCE EVERY DAY 24  Yes John Willis APRN - CNP   amLODIPine (NORVASC) 10 MG tablet TAKE 1 TABLET BY MOUTH ONCE EVERY DAY 24  Yes John Willis APRN - CNP   atorvastatin (LIPITOR) 20 MG tablet TAKE 1 TABLET BY MOUTH EVERY EVENING 24  Yes John Willis APRN - CNP   carvedilol (COREG) 25 MG tablet TAKE 1 TABLET BY MOUTH TWICE DAILY WITH MEALS 24  Yes John Willis APRN - CNP   docusate sodium (COLACE) 100 MG capsule TAKE 1 CAPSULE BY MOUTH TWICE DAILY 24  Yes John Willis APRN - CNP   furosemide (LASIX) 40 MG tablet TAKE 1 TABLET BY MOUTH ONCE EVERY DAY 24  Yes John Willis, APRN - CNP   montelukast (SINGULAIR) 10 MG tablet

## 2025-01-22 NOTE — PROGRESS NOTES
Discharge Criteria    Inpatients must meet Criteria 1 through 7. All other patients are either YES or N/A. If a NO is chosen then Anesthesia or Surgeon must be notified.    CRNA aware of pt's BP. States patient is good to be discharged.    1.  Minimum 30 minutes after last dose of sedative medication.    Yes      2.  Systolic BP between 90 - 160. Diastolic BP between 60 - 90.    No-see note      3.  Pulse between 60 - 120    Yes      4.  Respirations between 8 - 25.    Yes      5.  SpO2 92% - 100%.    Yes      6.  Able to cough and swallow or return to baseline function.    Yes      7.  Alert and oriented or return to baseline mental status.    Yes      8.  Demonstrates controlled, coordinated movements, ambulates with steady gait, or return to baseline activity function.    Yes      9.  Minimal or no pain or nausea, or at a level tolerable and acceptable to patient.    Yes      10. Takes and retains oral fluids as allowed.    Yes      11. Procedural / perioperative site stable.  Minimal or no bleeding.    Yes          12. If GI endoscopy procedure, minimal or no abdominal distention or passing flatus.    Yes      13. Written discharge instructions and emergency telephone number provided.    Yes      14. Accompanied by a responsible adult.    Yes-Caregiver, Joshua Schrader, legal guardian.

## 2025-01-22 NOTE — OP NOTE
Operative Note      Patient: Pietro Bob  YOB: 1970  MRN: [id number]    Date of Procedure: 1/22/2025    Pre-Op Diagnosis Codes:      * Positive FIT (fecal immunochemical test) [R19.5]    Post-Op Diagnosis: Same       Procedure(s):  COLONOSCOPY POLYPECTOMY SNARE/BIOPSY    Surgeon(s):  Bowen Wing DO    Assistant:   * No surgical staff found *    Anesthesia: Monitor Anesthesia Care    Estimated Blood Loss (mL): less than 50     Complications: None    Specimens:   ID Type Source Tests Collected by Time Destination   A : descending colon polyp Tissue Colon-Descending SURGICAL PATHOLOGY Bowen Wing, DO 1/22/2025 1313    B : sigmoid colon poltp Tissue Sigmoid Colon SURGICAL PATHOLOGY Bowen Wing, DO 1/22/2025 1315    C : rectal polyps Tissue Rectum SURGICAL PATHOLOGY Bowen Wing, DO 1/22/2025 1321        Implants:  * No implants in log *      Drains: * No LDAs found *    Findings:  Infection Present At Time Of Surgery (PATOS) (choose all levels that have infection present):  No infection present  Other Findings: 5 mm polyp at 75 cm, 5 mm polyp at 55 cm, 8 mm polyp at 25 cm, 3 mm polyp at 15 cm    Detailed Description of Procedure:     HISTORY: The patient is a 54 y.o. year old female with history of above preop diagnosis.  I recommended colonoscopy with possible biopsy or polypectomy and I explained the risk, benefits, expected outcome, and alternatives to the procedure.  Risks included but are not limited to bleeding, infection, respiratory distress, hypotension, and perforation of the colon.  The patient understands and is in agreement.        PROCEDURE: The patient was given IV conscious sedation per anesthesia. The patient was given 4 L of O2 /minute by nasal cannula.  The patient's SPO2 remained above 90% throughout the procedure. The colonoscope was inserted per rectum and advanced under direct vision to the cecum without difficulty.  Prep was adequate.
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
## 2025-01-24 LAB — SURGICAL PATHOLOGY REPORT: NORMAL

## 2025-02-25 DIAGNOSIS — G40.209 PARTIAL EPILEPSY WITH IMPAIRMENT OF CONSCIOUSNESS (HCC): ICD-10-CM

## 2025-02-25 NOTE — TELEPHONE ENCOUNTER
Received a call from [facility] for refill of Oxcarbazepine 150 mg. Next appointment is 5/5/25.

## 2025-02-27 RX ORDER — OXCARBAZEPINE 150 MG/1
TABLET, FILM COATED ORAL
Qty: 217 TABLET | Refills: 2 | Status: SHIPPED | OUTPATIENT
Start: 2025-02-27 | End: 2025-11-22

## 2025-02-28 RX ORDER — OXCARBAZEPINE 150 MG/1
TABLET, FILM COATED ORAL
Qty: 21 TABLET | Refills: 0 | Status: SHIPPED | OUTPATIENT
Start: 2025-02-28

## 2025-02-28 NOTE — TELEPHONE ENCOUNTER
Caregiver/nurse from [facility] called, stated will not receive [place] order before pt will run out of medication, requested 2-3 days worth to be sent to [facility]. Order pended for review

## 2025-05-05 ENCOUNTER — OFFICE VISIT (OUTPATIENT)
Dept: NEUROLOGY | Age: 55
End: 2025-05-05
Payer: MEDICARE

## 2025-05-05 VITALS
DIASTOLIC BLOOD PRESSURE: 84 MMHG | HEART RATE: 68 BPM | TEMPERATURE: 96.6 F | WEIGHT: 202.7 LBS | SYSTOLIC BLOOD PRESSURE: 136 MMHG | HEIGHT: 59 IN | BODY MASS INDEX: 40.86 KG/M2 | RESPIRATION RATE: 24 BRPM

## 2025-05-05 DIAGNOSIS — G40.209 PARTIAL EPILEPSY WITH IMPAIRMENT OF CONSCIOUSNESS (HCC): Primary | ICD-10-CM

## 2025-05-05 PROCEDURE — 1036F TOBACCO NON-USER: CPT | Performed by: NEUROMUSCULOSKELETAL MEDICINE, SPORTS MEDICINE

## 2025-05-05 PROCEDURE — G8427 DOCREV CUR MEDS BY ELIG CLIN: HCPCS | Performed by: NEUROMUSCULOSKELETAL MEDICINE, SPORTS MEDICINE

## 2025-05-05 PROCEDURE — G8417 CALC BMI ABV UP PARAM F/U: HCPCS | Performed by: NEUROMUSCULOSKELETAL MEDICINE, SPORTS MEDICINE

## 2025-05-05 PROCEDURE — 3079F DIAST BP 80-89 MM HG: CPT | Performed by: NEUROMUSCULOSKELETAL MEDICINE, SPORTS MEDICINE

## 2025-05-05 PROCEDURE — 99213 OFFICE O/P EST LOW 20 MIN: CPT | Performed by: NEUROMUSCULOSKELETAL MEDICINE, SPORTS MEDICINE

## 2025-05-05 PROCEDURE — 3017F COLORECTAL CA SCREEN DOC REV: CPT | Performed by: NEUROMUSCULOSKELETAL MEDICINE, SPORTS MEDICINE

## 2025-05-05 PROCEDURE — 3075F SYST BP GE 130 - 139MM HG: CPT | Performed by: NEUROMUSCULOSKELETAL MEDICINE, SPORTS MEDICINE

## 2025-05-05 PROCEDURE — 99211 OFF/OP EST MAY X REQ PHY/QHP: CPT

## 2025-05-05 NOTE — PATIENT INSTRUCTIONS
SURVEY:    Thank you for allowing us to care for you today.    You may be receiving a survey from [facility] regarding your visit today- electronically or via mail.      Please help us by completing the survey as this will provide the needed feedback to ensure we are providing the very best care for you and your family.    If you cannot score us a very good on any question, please call the office to discuss how we could have made your experience a very good one.    Thank you.       STAFF:    Maryam Oneil, Agustina MCALLISTER      CLINICAL STAFF:    Genesis BROWN, Citlali MCALLISTER, Cecily MCALLISTER, Olivia BROWN

## 2025-05-05 NOTE — PROGRESS NOTES
NEUROLOGY follow-up.    Patient Name:  Pietro Bob  :   1970  Clinic Visit Date: 2025    I saw Ms. Pietro Bob  in the neurology clinic today for follow up. 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  She is on Trileptal for the seizure disorder.  Doing okay.  No seizures on Trileptal.  No side effects.  She has had intermittent impaired balance which is a chronic problem.  Uses a walker.  No history of fall or injury.    REVIEW OF SYSTEMS    Constitutional Weight changes: absent, change in appetite: absent Fatigue: absent;Fevers : absent, Any recent hospitalizations:  absent   HEENT Ears: normal,  Visual disturbance: absent   Respiratory Shortness of breath: absent, choking:  absent, Cough: absent, Snoring : absent   Cardiovascular Chest pain: absent, Leg swelling :absent, palpitations : absent, fainting : absent   GI Constipation: absent, Diarrhea: absent, Swallowing change: absent    Urinary frequency: absent, Urinary urgency: absent, Urinary incontinence: present   Musculoskeletal Neck pain: absent, Back pain: absent, Stiffness: absent, Muscle pain: absent, Joint pain: absent, restless leg : absent   Dermatological Hair loss: absent, Skin changes: absent   Neurological Confusion: absent, Trouble concentrating: absent, Seizures: absent;  Memory loss: absent, balance problem: absent, Dizziness: absent, vertigo: absent, Weakness: absent, Numbness absent, Tremor: absent, Spasm: absent, involuntary movement: absent, Speech difficulty: absent, Headache: absent, Light sensitivity: absent   Psychiatric Anxiety: present, Depression  absent, drug abuse: absent, Hallucination: absent, mood disorder: absent, Suicidal ideations absent   Hematologic Abnormal bleeding: absent, Anemia: absent, Lymph gland changes: absent

## 2025-05-29 DIAGNOSIS — G40.209 PARTIAL EPILEPSY WITH IMPAIRMENT OF CONSCIOUSNESS (HCC): ICD-10-CM

## 2025-05-29 RX ORDER — OXCARBAZEPINE 150 MG/1
TABLET, FILM COATED ORAL
Qty: 217 TABLET | Refills: 2 | Status: SHIPPED | OUTPATIENT
Start: 2025-05-29 | End: 2026-02-21

## 2025-05-29 NOTE — TELEPHONE ENCOUNTER
Call received from Becca at [facility]. Patient needs a refill sent to [facility] pharmacy. She will be out on June 1. Please advise. Thank you

## 2025-06-02 DIAGNOSIS — G40.209 PARTIAL EPILEPSY WITH IMPAIRMENT OF CONSCIOUSNESS (HCC): ICD-10-CM

## 2025-06-02 RX ORDER — OXCARBAZEPINE 150 MG/1
TABLET, FILM COATED ORAL
Qty: 217 TABLET | Refills: 2 | Status: SHIPPED | OUTPATIENT
Start: 2025-06-02 | End: 2026-02-25

## 2025-06-02 NOTE — TELEPHONE ENCOUNTER
Becca from [facility] called stating the refill sent to [facility] for the patient was only for a 1 time fill as her mail order would not arrive on time. Patient is still in need of refills to be sent to [facility]. Next appointment is 5/4/25. Medication is loaded for your review.

## 2025-08-15 ENCOUNTER — HOSPITAL ENCOUNTER (OUTPATIENT)
Dept: LAB | Age: 55
Discharge: HOME | End: 2025-08-15
Payer: MEDICARE

## 2025-08-15 DIAGNOSIS — R32 URINARY INCONTINENCE, UNSPECIFIED TYPE: ICD-10-CM

## 2025-08-15 LAB
BACTERIA URNS QL MICRO: ABNORMAL
BILIRUB UR QL STRIP: NEGATIVE
CLARITY UR: CLEAR
COLOR UR: YELLOW
EPI CELLS #/AREA URNS HPF: ABNORMAL /HPF (ref 0–25)
GLUCOSE UR STRIP-MCNC: NEGATIVE MG/DL
HGB UR QL STRIP.AUTO: NEGATIVE
KETONES UR STRIP-MCNC: NEGATIVE MG/DL
LEUKOCYTE ESTERASE UR QL STRIP: NEGATIVE
NITRITE UR QL STRIP: NEGATIVE
PH UR STRIP: 7 [PH] (ref 5–9)
PROT UR STRIP-MCNC: NEGATIVE MG/DL
RBC #/AREA URNS HPF: ABNORMAL /HPF (ref 0–2)
SP GR UR STRIP: 1.01 (ref 1.01–1.02)
UROBILINOGEN UR STRIP-ACNC: NORMAL EU/DL (ref 0–1)
WBC #/AREA URNS HPF: ABNORMAL /HPF (ref 0–5)

## 2025-08-15 PROCEDURE — 81001 URINALYSIS AUTO W/SCOPE: CPT

## 2025-09-02 DIAGNOSIS — G40.209 PARTIAL EPILEPSY WITH IMPAIRMENT OF CONSCIOUSNESS (HCC): ICD-10-CM

## 2025-09-02 RX ORDER — OXCARBAZEPINE 150 MG/1
TABLET, FILM COATED ORAL
Qty: 49 TABLET | Refills: 0 | Status: SHIPPED | OUTPATIENT
Start: 2025-09-02

## 2025-09-02 RX ORDER — OXCARBAZEPINE 150 MG/1
TABLET, FILM COATED ORAL
Qty: 217 TABLET | Refills: 5 | Status: SHIPPED | OUTPATIENT
Start: 2025-09-02 | End: 2026-05-28

## (undated) DEVICE — TUBING, SUCTION, 9/32" X 12', STRAIGHT: Brand: MEDLINE INDUSTRIES, INC.

## (undated) DEVICE — PAD ADH AD ELECTRD 2 PLT W 5M CRD

## (undated) DEVICE — ACUSNARE POLYPECTOMY SNARE: Brand: ACUSNARE

## (undated) DEVICE — TRAP SURG QUAD PARABOLA SLOT DSGN SFTY SCRN TRAPEASE

## (undated) DEVICE — CANNULA ORAL NSL AD CO2 N INTUB O2 DEL DISP TRU LNK

## (undated) DEVICE — SOLUTION IV IRRIG POUR BRL 0.9% SODIUM CHL 2F7124

## (undated) DEVICE — STERILE POLYISOPRENE POWDER-FREE SURGICAL GLOVES: Brand: PROTEXIS

## (undated) DEVICE — NEEDLE HYPO 22GA L1.5IN BLK S STL HUB POLYPR SHLD REG BVL

## (undated) DEVICE — SYRINGE, LUER LOCK, 10ML: Brand: MEDLINE

## (undated) DEVICE — GAUZE,SPONGE,2"X2",8PLY,STERILE,LF,2'S: Brand: MEDLINE

## (undated) DEVICE — FORCEPS BX L240CM JAW DIA2.4MM ORNG L CAP W/ NDL DISP RAD

## (undated) DEVICE — NEEDLE HYPO 27GA L1.25IN GRY POLYPR HUB S STL REG BVL STR

## (undated) DEVICE — SOLUTION IRRIG 1000ML 0.9% SOD CHL USP POUR PLAS BTL

## (undated) DEVICE — BASIC PACK: Brand: MEDLINE INDUSTRIES, INC.

## (undated) DEVICE — DRAPE,UTILTY,TAPE,15X26, 4EA/PK: Brand: MEDLINE

## (undated) DEVICE — SUTURE VCRL SZ 3-0 L27IN ABSRB UD L26MM SH 1/2 CIR J416H

## (undated) DEVICE — TUBING SUCT NON-STRL 9/32X100 W/CNNT

## (undated) DEVICE — YANKAUER,BULB TIP,W/O VENT,RIGID,STERILE: Brand: MEDLINE

## (undated) DEVICE — DRAPE PED 77INX108IN REINF FENEST ARMBRD

## (undated) DEVICE — PENCIL ES L3M BTTN SWCH HOLSTER W/ BLDE ELECTRD EDGE

## (undated) DEVICE — 3M™ TEGADERM™ TRANSPARENT FILM DRESSING FRAME STYLE, 1624W, 2-3/8 IN X 2-3/4 IN (6 CM X 7 CM), 100/CT 4CT/CASE: Brand: 3M™ TEGADERM™

## (undated) DEVICE — INTENDED FOR TISSUE SEPARATION, AND OTHER PROCEDURES THAT REQUIRE A SHARP SURGICAL BLADE TO PUNCTURE OR CUT.: Brand: BARD-PARKER ® CARBON RIB-BACK BLADES